# Patient Record
Sex: MALE | Race: WHITE | NOT HISPANIC OR LATINO | Employment: FULL TIME | URBAN - METROPOLITAN AREA
[De-identification: names, ages, dates, MRNs, and addresses within clinical notes are randomized per-mention and may not be internally consistent; named-entity substitution may affect disease eponyms.]

---

## 2018-09-21 ENCOUNTER — OFFICE VISIT - HEALTHEAST (OUTPATIENT)
Dept: FAMILY MEDICINE | Facility: CLINIC | Age: 33
End: 2018-09-21

## 2018-09-21 DIAGNOSIS — G56.00 CARPAL TUNNEL SYNDROME: ICD-10-CM

## 2018-09-21 DIAGNOSIS — M94.0 TIETZE'S DISEASE: ICD-10-CM

## 2018-09-21 DIAGNOSIS — R80.9 PROTEINURIA: ICD-10-CM

## 2018-09-21 DIAGNOSIS — Z00.00 HEALTH CARE MAINTENANCE: ICD-10-CM

## 2018-09-21 DIAGNOSIS — R53.83 FATIGUE: ICD-10-CM

## 2018-09-21 DIAGNOSIS — R42 INTERMITTENT VERTIGO: ICD-10-CM

## 2018-09-21 DIAGNOSIS — H53.9 VISUAL DISTURBANCE: ICD-10-CM

## 2018-09-21 LAB
ALBUMIN SERPL-MCNC: 4.7 G/DL (ref 3.5–5)
ALBUMIN UR-MCNC: NEGATIVE MG/DL
ALP SERPL-CCNC: 68 U/L (ref 45–120)
ALT SERPL W P-5'-P-CCNC: 11 U/L (ref 0–45)
ANION GAP SERPL CALCULATED.3IONS-SCNC: 8 MMOL/L (ref 5–18)
APPEARANCE UR: CLEAR
AST SERPL W P-5'-P-CCNC: 15 U/L (ref 0–40)
BASOPHILS # BLD AUTO: 0 THOU/UL (ref 0–0.2)
BASOPHILS NFR BLD AUTO: 0 % (ref 0–2)
BILIRUB SERPL-MCNC: 0.8 MG/DL (ref 0–1)
BILIRUB UR QL STRIP: NEGATIVE
BUN SERPL-MCNC: 13 MG/DL (ref 8–22)
CALCIUM SERPL-MCNC: 10 MG/DL (ref 8.5–10.5)
CHLORIDE BLD-SCNC: 105 MMOL/L (ref 98–107)
CHOLEST SERPL-MCNC: 203 MG/DL
CO2 SERPL-SCNC: 27 MMOL/L (ref 22–31)
COLOR UR AUTO: YELLOW
CREAT SERPL-MCNC: 0.85 MG/DL (ref 0.7–1.3)
EOSINOPHIL # BLD AUTO: 0.1 THOU/UL (ref 0–0.4)
EOSINOPHIL NFR BLD AUTO: 1 % (ref 0–6)
ERYTHROCYTE [DISTWIDTH] IN BLOOD BY AUTOMATED COUNT: 12.3 % (ref 11–14.5)
FASTING STATUS PATIENT QL REPORTED: YES
GFR SERPL CREATININE-BSD FRML MDRD: >60 ML/MIN/1.73M2
GLUCOSE BLD-MCNC: 92 MG/DL (ref 70–125)
GLUCOSE UR STRIP-MCNC: NEGATIVE MG/DL
HCT VFR BLD AUTO: 43 % (ref 40–54)
HDLC SERPL-MCNC: 62 MG/DL
HGB BLD-MCNC: 14.8 G/DL (ref 14–18)
HGB UR QL STRIP: NEGATIVE
KETONES UR STRIP-MCNC: NEGATIVE MG/DL
LDLC SERPL CALC-MCNC: 132 MG/DL
LEUKOCYTE ESTERASE UR QL STRIP: NEGATIVE
LYMPHOCYTES # BLD AUTO: 1.7 THOU/UL (ref 0.8–4.4)
LYMPHOCYTES NFR BLD AUTO: 43 % (ref 20–40)
MCH RBC QN AUTO: 30.8 PG (ref 27–34)
MCHC RBC AUTO-ENTMCNC: 34.4 G/DL (ref 32–36)
MCV RBC AUTO: 89 FL (ref 80–100)
MONOCYTES # BLD AUTO: 0.4 THOU/UL (ref 0–0.9)
MONOCYTES NFR BLD AUTO: 9 % (ref 2–10)
NEUTROPHILS # BLD AUTO: 1.9 THOU/UL (ref 2–7.7)
NEUTROPHILS NFR BLD AUTO: 47 % (ref 50–70)
NITRATE UR QL: NEGATIVE
PH UR STRIP: 7 [PH] (ref 5–8)
PLATELET # BLD AUTO: 223 THOU/UL (ref 140–440)
PMV BLD AUTO: 8.3 FL (ref 7–10)
POTASSIUM BLD-SCNC: 4.4 MMOL/L (ref 3.5–5)
PROT SERPL-MCNC: 7.3 G/DL (ref 6–8)
RBC # BLD AUTO: 4.81 MILL/UL (ref 4.4–6.2)
SODIUM SERPL-SCNC: 140 MMOL/L (ref 136–145)
SP GR UR STRIP: 1.01 (ref 1–1.03)
TRIGL SERPL-MCNC: 43 MG/DL
TSH SERPL DL<=0.005 MIU/L-ACNC: 1.21 UIU/ML (ref 0.3–5)
UROBILINOGEN UR STRIP-ACNC: NORMAL
WBC: 4.1 THOU/UL (ref 4–11)

## 2018-09-21 ASSESSMENT — MIFFLIN-ST. JEOR: SCORE: 1631.04

## 2018-09-24 ENCOUNTER — COMMUNICATION - HEALTHEAST (OUTPATIENT)
Dept: FAMILY MEDICINE | Facility: CLINIC | Age: 33
End: 2018-09-24

## 2018-09-24 LAB
25(OH)D3 SERPL-MCNC: 32.9 NG/ML (ref 30–80)
25(OH)D3 SERPL-MCNC: 32.9 NG/ML (ref 30–80)

## 2019-04-02 ENCOUNTER — OFFICE VISIT - HEALTHEAST (OUTPATIENT)
Dept: FAMILY MEDICINE | Facility: CLINIC | Age: 34
End: 2019-04-02

## 2019-04-02 DIAGNOSIS — B08.4 HAND, FOOT AND MOUTH DISEASE: ICD-10-CM

## 2019-04-02 DIAGNOSIS — L21.9 SEBORRHEIC DERMATITIS OF SCALP: ICD-10-CM

## 2019-11-11 ENCOUNTER — OFFICE VISIT - HEALTHEAST (OUTPATIENT)
Dept: FAMILY MEDICINE | Facility: CLINIC | Age: 34
End: 2019-11-11

## 2019-11-11 ENCOUNTER — COMMUNICATION - HEALTHEAST (OUTPATIENT)
Dept: FAMILY MEDICINE | Facility: CLINIC | Age: 34
End: 2019-11-11

## 2019-11-11 DIAGNOSIS — Z00.00 HEALTH CARE MAINTENANCE: ICD-10-CM

## 2019-11-11 DIAGNOSIS — G47.9 SLEEP DISTURBANCE: ICD-10-CM

## 2019-11-11 LAB
ALBUMIN SERPL-MCNC: 4.6 G/DL (ref 3.5–5)
ALP SERPL-CCNC: 66 U/L (ref 45–120)
ALT SERPL W P-5'-P-CCNC: 17 U/L (ref 0–45)
ANION GAP SERPL CALCULATED.3IONS-SCNC: 10 MMOL/L (ref 5–18)
AST SERPL W P-5'-P-CCNC: 20 U/L (ref 0–40)
BILIRUB SERPL-MCNC: 1 MG/DL (ref 0–1)
BUN SERPL-MCNC: 11 MG/DL (ref 8–22)
CALCIUM SERPL-MCNC: 9.6 MG/DL (ref 8.5–10.5)
CHLORIDE BLD-SCNC: 104 MMOL/L (ref 98–107)
CHOLEST SERPL-MCNC: 213 MG/DL
CO2 SERPL-SCNC: 28 MMOL/L (ref 22–31)
CREAT SERPL-MCNC: 0.81 MG/DL (ref 0.7–1.3)
FASTING STATUS PATIENT QL REPORTED: YES
GFR SERPL CREATININE-BSD FRML MDRD: >60 ML/MIN/1.73M2
GLUCOSE BLD-MCNC: 93 MG/DL (ref 70–125)
HDLC SERPL-MCNC: 57 MG/DL
LDLC SERPL CALC-MCNC: 135 MG/DL
POTASSIUM BLD-SCNC: 4.3 MMOL/L (ref 3.5–5)
PROT SERPL-MCNC: 7.3 G/DL (ref 6–8)
SODIUM SERPL-SCNC: 142 MMOL/L (ref 136–145)
TRIGL SERPL-MCNC: 104 MG/DL

## 2019-11-11 ASSESSMENT — MIFFLIN-ST. JEOR: SCORE: 1635.58

## 2019-11-22 ENCOUNTER — COMMUNICATION - HEALTHEAST (OUTPATIENT)
Dept: FAMILY MEDICINE | Facility: CLINIC | Age: 34
End: 2019-11-22

## 2020-02-17 ENCOUNTER — OFFICE VISIT - HEALTHEAST (OUTPATIENT)
Dept: SLEEP MEDICINE | Facility: CLINIC | Age: 35
End: 2020-02-17

## 2020-02-17 DIAGNOSIS — G47.8 SLEEP DYSFUNCTION WITH SLEEP STAGE DISTURBANCE: ICD-10-CM

## 2020-02-17 DIAGNOSIS — R40.0 INTERMITTENT DROWSINESS: ICD-10-CM

## 2020-02-17 DIAGNOSIS — R06.83 SNORING: ICD-10-CM

## 2020-02-17 ASSESSMENT — MIFFLIN-ST. JEOR: SCORE: 1641.02

## 2020-11-24 ENCOUNTER — OFFICE VISIT - HEALTHEAST (OUTPATIENT)
Dept: FAMILY MEDICINE | Facility: CLINIC | Age: 35
End: 2020-11-24
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
## 2020-11-24 DIAGNOSIS — R44.8 PARESTHESIA OF TONGUE: ICD-10-CM

## 2020-11-27 ENCOUNTER — AMBULATORY - HEALTHEAST (OUTPATIENT)
Dept: LAB | Facility: CLINIC | Age: 35
End: 2020-11-27
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
## 2020-11-30 ENCOUNTER — COMMUNICATION - HEALTHEAST (OUTPATIENT)
Dept: FAMILY MEDICINE | Facility: CLINIC | Age: 35
End: 2020-11-30

## 2020-11-30 LAB — B BURGDOR IGG+IGM SER QL: 0.2 INDEX VALUE

## 2021-01-28 ENCOUNTER — TELEPHONE (OUTPATIENT)
Dept: SLEEP MEDICINE | Facility: CLINIC | Age: 36
End: 2021-01-28

## 2021-01-28 DIAGNOSIS — R40.0 INTERMITTENT DROWSINESS: Primary | ICD-10-CM

## 2021-01-28 DIAGNOSIS — G47.9 SLEEP DISTURBANCE: ICD-10-CM

## 2021-01-28 DIAGNOSIS — R06.83 SNORING: ICD-10-CM

## 2021-01-28 NOTE — TELEPHONE ENCOUNTER
Reason for call:  Other   Patient called regarding (reason for call): appointment  Additional comments: Patient called to schedule sleep study, already had consult.    Phone number to reach patient:  Cell number on file:    Telephone Information:   Mobile [phone]       Best Time:  Anytime    Can we leave a detailed message on this number?  YES    Travel screening: Not Applicable

## 2021-02-01 NOTE — TELEPHONE ENCOUNTER
Reviewed [facility] chart and did not see office visit or sleep study order. Reviewed [facility] chart and patient had consult with Dr. Su 2/17/2020 and requested to have a HST. Order for HST was not placed.      Federico Flores  Sleep Clinic Specialist - [place]

## 2021-02-08 NOTE — TELEPHONE ENCOUNTER
Called 2/8 and left voicemail for See to please return our call to schedule HST and two week follow up with provider to go over results.    Federico Flores  Sleep Clinic Specialist - [place]

## 2021-03-22 ENCOUNTER — OFFICE VISIT (OUTPATIENT)
Dept: SLEEP MEDICINE | Facility: CLINIC | Age: 36
End: 2021-03-22
Payer: COMMERCIAL

## 2021-03-22 DIAGNOSIS — G47.9 SLEEP DISTURBANCE: ICD-10-CM

## 2021-03-22 DIAGNOSIS — R40.0 INTERMITTENT DROWSINESS: ICD-10-CM

## 2021-03-22 DIAGNOSIS — R06.83 SNORING: ICD-10-CM

## 2021-03-22 PROCEDURE — G0399 HOME SLEEP TEST/TYPE 3 PORTA: HCPCS | Performed by: INTERNAL MEDICINE

## 2021-03-23 ENCOUNTER — DOCUMENTATION ONLY (OUTPATIENT)
Dept: SLEEP MEDICINE | Facility: CLINIC | Age: 36
End: 2021-03-23
Payer: COMMERCIAL

## 2021-03-23 NOTE — PROGRESS NOTES
HST POST-STUDY QUESTIONNAIRE    1. What time did you go to bed?  10  2. How long do you think it took to fall asleep?  15 min  3. What time did you wake up to start the day?  0630  4. Did you get up during the night at all?  yes  5. If you woke up, do you remember approximately what time(s)? 0300  6. Did you have any difficulty with the equipment?  No  7. Did you us any type of treatment with this study?  None  8. Was the head of the bed elevated? No  9. Did you sleep in a recliner?  No  10. Did you stop using CPAP at least 3 days before this test?  NA  11. Any other information you'd like us to know? It seems like I was on my back the whole night.      This HSAT was performed using a Noxturnal T3 device which recorded snore, sound, movement activity, body position, nasal pressure, oronasal thermal airflow, pulse, oximetry and both chest and abdominal respiratory effort. HSAT data was restricted to the time patient states they were in bed.     HSAT was scored using 1B 4% hypopnea rule.     HST AHI (Non-PAT): 1.7  Snoring was reported as intermittent.  Time with SpO2 below 89% was 0 minutes.   Overall signal quality was good     Pt will follow up with sleep provider to determine appropriate therapy.

## 2021-03-23 NOTE — PROCEDURES
"HOME SLEEP STUDY INTERPRETATION    Patient: See Blakely  MRN: [id number]  YOB: 1985  Study Date: 3/22/2021  Referring Provider: Cristian Scruggs;   Ordering Provider: Seth Su DO     Indications for Home Study: See Blakely is a 35 year old male who presents with symptoms suggestive of obstructive sleep apnea.    Estimated body mass index is 23.4 kg/m  as calculated from the following:    Height as of 12/20/06: 1.676 m (5' 6\").    Weight as of 12/20/06: 65.8 kg (145 lb).    Data: A full night home sleep study was performed recording the standard physiologic parameters including body position, movement, sound, nasal pressure, thermal oral airflow, chest and abdominal movements with respiratory inductance plethysmography, and oxygen saturation by pulse oximetry. Pulse rate was estimated by oximetry recording. This study was considered adequate based on > 4 hours of quality oximetry and respiratory recording. As specified by the AASM Manual for the Scoring of Sleep and Associated events, version 2.3, Rule VIII.D 1B, 4% oxygen desaturation scoring for hypopneas is used as a standard of care on all home sleep apnea testing.    Analysis Time:  462.7 minutes    Respiration:   Sleep Associated Hypoxemia: sustained hypoxemia was not present. Baseline oxygen saturation was 93.5%.  Time with saturation less than or equal to 88% was 0 minutes. The lowest oxygen saturation was 90%.   Snoring: Snoring was present.  Respiratory events: The home study revealed a presence of 7 obstructive apneas and 6 mixed and central apneas. There were 3 hypopneas resulting in a combined apnea/hypopnea index [AHI] of 2.1 events per hour.  AHI was 2.1 per hour supine, 0 per hour prone, 0 per hour on left side, and 0 per hour on right side.   Pattern: Excluding events noted above, respiratory rate and pattern was Normal.    Position: Percent of time spent: supine - 99.6%, prone - 0%, on left - 0%, on right - " 0%.    Heart Rate: By pulse oximetry normal rate was noted.     Assessment:   Patient did not rule in for obstructive sleep apnea.  Sleep associated hypoxemia was not present.    Recommendations:  Consider oral appliance therapy or positional therapy.  Suggest optimizing sleep hygiene and avoiding sleep deprivation.  Weight management.    Diagnosis Code(s): Snoring R06.83    Seth Su DO, March 23, 2021   Diplomate, American Board of Internal Medicine, Sleep Medicine

## 2021-04-01 NOTE — PROGRESS NOTES
See is a 35 year old who is being evaluated via a billable video visit.      How would you like to obtain your AVS? MyChart  If the video visit is dropped, the invitation should be resent by: Text to cell phone: [phone]  Will anyone else be joining your video visit? No    Video-Visit Details    Type of service:  Video Visit    Originating Location (pt. Location): Home    Distant Location (provider location):  [facility]     Platform used for Video Visit: [facility] On [facility]    Thank you for the opportunity to participate in the care of See Blakely.     He is a 35 year old  male patient who comes to the sleep medicine clinic for review of sleep study results. The study was completed on 03/22/21  which showed that the patient had snoring but did not rule in for obstructive sleep apnea.    Assessment and Plan:  1. Snoring  We discussed the treatment options available including weight loss, oral appliance, positional therapy and muscle strengthening exercises. I also offered the patient the option of getting an in lab sleep study. He would like to discuss this with his wife before making a final decision.    Patient Active Problem List   Diagnosis     Migraine headache     Other malaise and fatigue     Pectus excavatum     Tietze's disease       Current Outpatient Medications   Medication Sig Dispense Refill     NO ACTIVE MEDICATIONS .         No Known Allergies    Labs/Studies:  - We reviewed the results of the overnight study as described on the HPI.     No results found for: MODESTA      Patient verbalized understanding of these issues, agrees with the plan and all questions were answered today. Patient was given an opportuntity to voice any other symptoms or concerns not listed above. Patient did not have any other symptoms or concerns.        Seth Su DO  Board Certified in Internal Medicine and Sleep Medicine    I spent a total of 10 minutes of face-to-face encounter and in  preparation for this clinic visit.    (Note created with Dragon voice recognition and unintended spelling errors and word substitutions may occur)

## 2021-04-05 ENCOUNTER — VIRTUAL VISIT (OUTPATIENT)
Dept: SLEEP MEDICINE | Facility: CLINIC | Age: 36
End: 2021-04-05
Payer: COMMERCIAL

## 2021-04-05 DIAGNOSIS — R06.83 SNORING: Primary | ICD-10-CM

## 2021-04-05 PROBLEM — M94.0 TIETZE'S DISEASE: Status: ACTIVE | Noted: 2021-04-05

## 2021-04-05 PROBLEM — R53.83 OTHER MALAISE AND FATIGUE: Status: ACTIVE | Noted: 2021-04-05

## 2021-04-05 PROBLEM — G43.909 MIGRAINE HEADACHE: Status: ACTIVE | Noted: 2021-04-05

## 2021-04-05 PROBLEM — Q67.6 PECTUS EXCAVATUM: Status: ACTIVE | Noted: 2021-04-05

## 2021-04-05 PROBLEM — R53.81 OTHER MALAISE AND FATIGUE: Status: ACTIVE | Noted: 2021-04-05

## 2021-04-05 PROCEDURE — 99207 PR NO BILLABLE SERVICE THIS VISIT: CPT | Performed by: INTERNAL MEDICINE

## 2021-05-27 NOTE — PATIENT INSTRUCTIONS - HE
Use prescription shampoo daily for two weeks.     After improved can use Selsum Blue or T gel type shampoo once or twice a week.

## 2021-05-27 NOTE — PROGRESS NOTES
ASSESSMENT:  1. Seborrheic dermatitis of scalp  ketoconazole (NIZORAL) 2 % shampoo   2. Hand, foot and mouth disease         PLAN:  Rash on scalp consistent with seborrheic dermatitis. Trial shampoo and let us know if not working as expected.  Hand foot and mouth is viral, continue symptomatic cares, discussed expected course of illness.  No problem-specific Assessment & Plan notes found for this encounter.      Patient Instructions   Use prescription shampoo daily for two weeks.     After improved can use Selsum Blue or T gel type shampoo once or twice a week.      No orders of the defined types were placed in this encounter.    There are no discontinued medications.    No Follow-up on file.    CHIEF COMPLAINT:  Chief Complaint   Patient presents with     Rash     right side on top of head, hands and feet       HISTORY OF PRESENT ILLNESS:  See is a 33 y.o. male here for evaluation of scalp rash on and off for the past few months. Has gotten worse in the past week since he thinks he has hand foot and mouth which has been going around at child's . He has rash in his throat and on hands. He had a fever on Friday, 4 days ago. No fever since. Scalp rash preceded HFM. He has had it on/off for about 6 months but it is worse. He is noticing red spots with some crusting. Sometimes itchy.     REVIEW OF SYSTEMS:        All other systems are negative  PFSH:  Reviewed, no changes      TOBACCO USE:  Social History     Tobacco Use   Smoking Status Never Smoker   Smokeless Tobacco Never Used       VITALS:  Vitals:    04/02/19 1003   BP: 113/80   Patient Site: Left Arm   Patient Position: Sitting   Cuff Size: Adult Regular   Pulse: 69   Resp: 16   Temp: 97.9  F (36.6  C)   TempSrc: Oral   Weight: 143 lb 3.2 oz (65 kg)     Wt Readings from Last 3 Encounters:   04/02/19 143 lb 3.2 oz (65 kg)   09/21/18 148 lb 12.8 oz (67.5 kg)       PHYSICAL EXAM:   /80 (Patient Site: Left Arm, Patient Position: Sitting, Cuff Size:  Adult Regular)   Pulse 69   Temp 97.9  F (36.6  C) (Oral)   Resp 16   Wt 143 lb 3.2 oz (65 kg)   BMI 19.83 kg/m    General appearance: alert, appears stated age and cooperative  Head: Normocephalic, without obvious abnormality, atraumatic, scalp lesions, red scalp lesions with yellow crust  Eyes: conjunctivae/corneas clear. PERRL, EOM's intact. Fundi benign.  Ears: normal TM's and external ear canals both ears  Nose: Nares normal. Septum midline. Mucosa normal. No drainage or sinus tenderness.  Throat: lesions in back throat consistent with hand foot mouth  Neck: mild anterior cervical adenopathy, no carotid bruit, no JVD, supple, symmetrical, trachea midline and thyroid not enlarged, symmetric, no tenderness/mass/nodules  Lungs: clear to auscultation bilaterally  Skin: red lesions on hands fingertips    DATA REVIEWED:  Additional History from Old Records Summarized (2): 0  Decision to Obtain Records (1): 0  Radiology Tests Summarized or Ordered (1): 0  Labs Reviewed or Ordered (1): 0  Medicine Test Summarized or Ordered (1): 0  Independent Review of EKG or X-RAY(2 each): 0    The visit lasted a total of 20 minutes face to face with the patient. Over 50% of the time was spent counseling and educating the patient about plan of care.    MEDICATIONS:  Current Outpatient Medications   Medication Sig Dispense Refill     ketoconazole (NIZORAL) 2 % shampoo Shampoo daily for 2 weeks. Leave on scalp for approximately 5-10 minutes then rinse. 120 mL 0     No current facility-administered medications for this visit.        This note has been dictated using voice recognition software. Any grammatical or context distortions are unintentional and inherent to the software

## 2021-06-02 ENCOUNTER — RECORDS - HEALTHEAST (OUTPATIENT)
Dept: ADMINISTRATIVE | Facility: CLINIC | Age: 36
End: 2021-06-02

## 2021-06-02 VITALS — WEIGHT: 143.2 LBS | BODY MASS INDEX: 19.83 KG/M2

## 2021-06-02 VITALS — BODY MASS INDEX: 20.83 KG/M2 | WEIGHT: 148.8 LBS | HEIGHT: 71 IN

## 2021-06-03 VITALS
TEMPERATURE: 96.6 F | RESPIRATION RATE: 16 BRPM | DIASTOLIC BLOOD PRESSURE: 66 MMHG | WEIGHT: 149.8 LBS | BODY MASS INDEX: 20.97 KG/M2 | HEART RATE: 64 BPM | HEIGHT: 71 IN | SYSTOLIC BLOOD PRESSURE: 115 MMHG

## 2021-06-03 NOTE — PROGRESS NOTES
Assessment:      Healthy male exam.    Encounter Diagnoses   Name Primary?     Health care maintenance Yes     Sleep disturbance          Plan:       All questions answered.       Fasting labs    Begin colonoscopy screening at age 39    Schedule sleep evaluation       Subjective:      See Blakely is a 34 y.o. male who presents for an annual exam. The patient reports that there is not domestic violence in his life.     Healthy Habits:   Regular Exercise: Yes and a little  Sunscreen Use: Yes  Healthy Diet: Yes  Dental Visits Regularly: Yes  Seat Belt: Yes  Sexually active: Yes  Monthly Self Testicular Exams:  Yes  Hemoccults: N/A  Flex Sig: N/A  Colonoscopy: N/A  Lipid Profile: Yes  Glucose Screen: Yes  Prevention of Osteoporosis: N/A  Last Dexa: N/A  Guns at Home:  No      Immunization History   Administered Date(s) Administered     Dtap 1985, 1985, 02/18/1985, 05/21/1986, 09/14/1989     Hep B, Peds or Adolescent 01/21/1993, 07/07/2000, 12/22/2000     Influenza, inj, historic,unspecified 10/26/2019     Influenza,seasonal,quad inj =/> 6months 09/26/2018     MMR 10/30/1996     OPV,Trivalent,Historic(5931-1531 only) 1984, 1984, 05/21/1986, 09/13/1989     Td, Adult, Absorbed 10/30/1996     Tdap 01/26/2015     Immunization status: up to date and documented.    No exam data present    Current Outpatient Medications   Medication Sig Dispense Refill     ketoconazole (NIZORAL) 2 % shampoo Shampoo daily for 2 weeks. Leave on scalp for approximately 5-10 minutes then rinse. 120 mL 0     No current facility-administered medications for this visit.      No past medical history on file.  No past surgical history on file.  Patient has no known allergies.  Family History   Problem Relation Age of Onset     Colon cancer Father 62     Throat cancer Father      Social History     Socioeconomic History     Marital status:      Spouse name: Not on file     Number of children: Not on file     Years of  education: Not on file     Highest education level: Not on file   Occupational History     Not on file   Social Needs     Financial resource strain: Not on file     Food insecurity:     Worry: Not on file     Inability: Not on file     Transportation needs:     Medical: Not on file     Non-medical: Not on file   Tobacco Use     Smoking status: Never Smoker     Smokeless tobacco: Never Used   Substance and Sexual Activity     Alcohol use: Yes     Alcohol/week: 3.0 standard drinks     Types: 3 Glasses of wine per week     Drug use: No     Sexual activity: Yes     Partners: Female   Lifestyle     Physical activity:     Days per week: Not on file     Minutes per session: Not on file     Stress: Not on file   Relationships     Social connections:     Talks on phone: Not on file     Gets together: Not on file     Attends Gnosticism service: Not on file     Active member of club or organization: Not on file     Attends meetings of clubs or organizations: Not on file     Relationship status: Not on file     Intimate partner violence:     Fear of current or ex partner: Not on file     Emotionally abused: Not on file     Physically abused: Not on file     Forced sexual activity: Not on file   Other Topics Concern     Not on file   Social History Narrative     Not on file       Review of Systems  General:  Denies problem  Eyes: Denies problem  Ears/Nose/Throat: Denies problem  Cardiovascular: Denies problem  Respiratory:  Denies problem  Gastrointestinal:  Denies problem  Genitourinary: Denies problem  Musculoskeletal:  Denies problem  Skin: Denies problem  Neurologic: Denies problem  Psychiatric: Denies problem  Endocrine: Denies problem  Heme/Lymphatic: Denies problem   Allergic/Immunologic: Denies problem       Right hip pain, intermittent, pain is achy and sharp at times, weight bearing or walking doesn't affect it.  More noted ;with sitting.  5/10 is worse pain,  About 4-5 months duration.       Objective:     Vitals:     "11/11/19 0730   BP: 115/66   Pulse: 64   Resp: 16   Temp: 96.6  F (35.9  C)   TempSrc: Oral   Weight: 149 lb 12.8 oz (67.9 kg)   Height: 5' 11.25\" (1.81 m)     Body mass index is 20.75 kg/m .    Physical  General Appearance: Alert, cooperative, no distress, appears stated age  Head: Normocephalic, without obvious abnormality, atraumatic  Eyes: PERRL, conjunctiva/corneas clear, EOM's intact  Ears: Normal TM's and external ear canals, both ears  Nose: Nares normal, septum midline,mucosa normal, no drainage  Throat: Lips, mucosa, and tongue normal; teeth and gums normal  Neck: Supple, symmetrical, trachea midline, no adenopathy;  thyroid: not enlarged, symmetric, no tenderness/mass/nodules; no carotid bruit or JVD  Back: Symmetric, no curvature, ROM normal, no CVA tenderness  Lungs: Clear to auscultation bilaterally, respirations unlabored  Heart: Regular rate and rhythm, S1 and S2 normal, no murmur, rub, or gallop,  Abdomen: Soft, non-tender, bowel sounds active all four quadrants,  no masses, no organomegaly  Genitourinary: Penis normal. Right testis is descended. Left testis is descended.   Musculoskeletal: Normal range of motion. No joint swelling or deformity.   Extremities: Extremities normal, atraumatic, no cyanosis or edema  Skin: Skin color, texture, turgor normal, no rashes or lesions  Lymph nodes: Cervical, supraclavicular, and axillary nodes normal  Neurologic: He is alert. He has normal reflexes.   Psychiatric: He has a normal mood and affect.            "

## 2021-06-03 NOTE — TELEPHONE ENCOUNTER
See dropped off a Wellness form .    Form placed in [employer] bin    Please call [phone] for pickup

## 2021-06-04 VITALS
OXYGEN SATURATION: 97 % | BODY MASS INDEX: 21.14 KG/M2 | DIASTOLIC BLOOD PRESSURE: 74 MMHG | SYSTOLIC BLOOD PRESSURE: 123 MMHG | HEART RATE: 72 BPM | WEIGHT: 151 LBS | HEIGHT: 71 IN

## 2021-06-06 NOTE — PROGRESS NOTES
Dear Dr. Decker, [facility]  [address] [place] [address]    Thank you for the opportunity to participate in the care of Mr. See Blakely.    Assessment and Plan:    In summary See Blakely is a 34 y.o. year old male here for evaluation of sleep disturbance.  1.  Intermittent drowsiness   Mr. See Blakely has high risk for obstructive sleep apnea based on the history of intermittent drowsiness, snoring and a crowded airway. I educated the patient on the underlying pathophysiology of obstructive sleep apnea. We reviewed the risks associated with sleep apnea, including increased cardiovascular risk and overall death. We talked about treatments briefly. I recommend getting  A Home sleep study. The patient should return to the clinic to discuss results and treatment option in a patient-centered approach.  2.  Snoring  3.  Other sleep disturbance    History of present illness:    He is a 34 y.o. male who comes to the clinic with a chief complaint of intermittent drowsiness that is been going on for more than a year.  While the patient denies any episodes of witnessed apnea he has been told that he does have loud snoring during sleep.  He would feel episodes of drowsiness on the weekend when he is reading to his children despite adequate hours of sleep.  The patient's review of system is otherwise negative.     Ideal Sleep-Wake Cycle(devoid of societal pressure):    Patient would try to initiate sleep at around 11 PM with a sleep latency of less than 10 minutes. The patient would have 1 awakening. Final wake up time is around 6 AM.    Patient told to return in one week after the sleep study is interpreted.    Past Medical History  History reviewed. No pertinent past medical history.     Past Surgical History  History reviewed. No pertinent surgical history.     Meds  Current Outpatient Medications   Medication Sig Dispense Refill     ketoconazole (NIZORAL) 2 % shampoo Shampoo daily for 2  weeks. Leave on scalp for approximately 5-10 minutes then rinse. 120 mL 0     No current facility-administered medications for this visit.         Allergies  Patient has no known allergies.     Social History  Social History     Socioeconomic History     Marital status:      Spouse name: Not on file     Number of children: Not on file     Years of education: Not on file     Highest education level: Not on file   Occupational History     Not on file   Social Needs     Financial resource strain: Not on file     Food insecurity:     Worry: Not on file     Inability: Not on file     Transportation needs:     Medical: Not on file     Non-medical: Not on file   Tobacco Use     Smoking status: Never Smoker     Smokeless tobacco: Never Used   Substance and Sexual Activity     Alcohol use: Yes     Alcohol/week: 3.0 standard drinks     Types: 3 Glasses of wine per week     Drug use: No     Sexual activity: Yes     Partners: Female   Lifestyle     Physical activity:     Days per week: Not on file     Minutes per session: Not on file     Stress: Not on file   Relationships     Social connections:     Talks on phone: Not on file     Gets together: Not on file     Attends Zoroastrian service: Not on file     Active member of club or organization: Not on file     Attends meetings of clubs or organizations: Not on file     Relationship status: Not on file     Intimate partner violence:     Fear of current or ex partner: Not on file     Emotionally abused: Not on file     Physically abused: Not on file     Forced sexual activity: Not on file   Other Topics Concern     Not on file   Social History Narrative     Not on file        Family History  Family History   Problem Relation Age of Onset     Colon cancer Father 62     Throat cancer Father      Snoring Father      Review of Systems:  Constitutional: Negative except as noted in HPI.   Eyes: Negative except as noted in HPI.   ENT: Negative except as noted in HPI.  "  Cardiovascular: Negative except as noted in HPI.   Respiratory: Negative except as noted in HPI.   Gastrointestinal: Negative except as noted in HPI.   Genitourinary: Negative except as noted in HPI.   Musculoskeletal: Negative except as noted in HPI.   Integumentary: Negative except as noted in HPI.   Neurological: Negative except as noted in HPI.   Psychiatric: Negative except as noted in HPI.   Endocrine: Negative except as noted in HPI.   Hematologic/Lymphatic: Negative except as noted in HPI.      STOP BANG 2/17/2020   Do you snore loudly (louder than talking or loud enough to be heard through closed doors)? 1   Do you often feel tired, fatigued, or sleepy during daytime? 1   Has anyone observed you stop breathing in your sleep? 0   Do you have or are you being treated for high blood pressure? 0   BMI more than 35 kg/m2 0   Age over 50 years old? 0   Neck circumference greater than 16 inches? 0   Gender male? 1   Total Score 3     Epworths Sleepiness Scale 2/17/2020   Sitting and reading 3   Watching TV 2   Sitting, inactive in a public place (e.g. a theatre or a meeting) 0   As a passenger in a car for an hour without a break 2   Lying down to rest in the afternoon when circumstances permit 3   Sitting and talking to someone 1   Sitting quietly after a lunch without alcohol 0   In a car, while stopped for a few minutes in traffic 0   Total score 11     Rooming 2/17/2020   Usual bedtime 9-10pm   Sleep Latency <10 minutes   Awakenings 0-1 time   Wake Up Time 6am   Weekends varies   Coffee 1 cup qd   Difficulty falling asleep No   Difficulty staying asleep No   Excessive daytime tiredness No   Excessive daytime sleepiness Yes   Dozing off while driving Yes   Shift Worker No   Sleep Walking? No   Sleep Talking? Yes   Kicking or punching? Yes   Restless legs symptoms No       Physical Exam:  /74   Pulse 72   Ht 5' 11.25\" (1.81 m)   Wt 151 lb (68.5 kg)   SpO2 97%   BMI 20.91 kg/m    BMI:Body mass index " "is 20.91 kg/m .   GEN: NAD, appropriate for age  Head: Normocephalic.  EYES: PERRLA, EOMI  ENT: Oropharynx is clear, Paulino class 3+ airway. Uvula is intact  Nasal mucosa is moist without erythema  Neck : Thyroid is within normal limits. Neck Circ 14\"  CV: Regular rate and rhythm, S1 & S2 positive.  LUNGS: Bilateral breathsounds heard.   ABDOMEN: Positive bowel sounds in all quadrants, soft, no rebound or guarding  MUSCULOSKELETAL: Bilateral trace leg swelling  SKIN: warm, dry, no rashes  Neurological: Alert, oriented to time, place, and person.  Psych: normal mood, normal affect     Labs/Studies:     Lab Results   Component Value Date    WBC 4.1 09/21/2018    HGB 14.8 09/21/2018    HCT 43.0 09/21/2018    MCV 89 09/21/2018     09/21/2018         Chemistry        Component Value Date/Time     11/11/2019 0800    K 4.3 11/11/2019 0800     11/11/2019 0800    CO2 28 11/11/2019 0800    BUN 11 11/11/2019 0800    CREATININE 0.81 11/11/2019 0800    GLU 93 11/11/2019 0800        Component Value Date/Time    CALCIUM 9.6 11/11/2019 0800    ALKPHOS 66 11/11/2019 0800    AST 20 11/11/2019 0800    ALT 17 11/11/2019 0800    BILITOT 1.0 11/11/2019 0800            No results found for: FERRITIN  Lab Results   Component Value Date    TSH 1.21 09/21/2018         Patient verbalized understanding of these issues, agrees with the plan and all questions were answered today. Patient was given an opportuntity to voice any other symptoms or concerns not listed above. Patient did not have any other symptoms or concerns.         Seth Su DO  Board Certified in Internal Medicine and Sleep Medicine    (Note created with Dragon voice recognition and unintended spelling errors and word substitutions may occur)     "

## 2021-06-06 NOTE — PATIENT INSTRUCTIONS - HE
What is a Home Sleep Study?    your doctor can give you a portable sleep monitor to use at home, so you don t have to spend the night in the sleep lab. But you should use a portable monitor only if:   ?Your doctor thinks you have a condition that makes you stop breathing for short periods while you are asleep, called  sleep apnea.    ?You do not have other serious medical problems, such as heart disease or lung disease.    Please bring the home sleep study device back to the sleep center as soon as you are finished with it so we can score it.     The cost of care estimate line is [phone]. They are able to give the patient an estimate of the charges and also an estimate of their insurance coverage/patient responsibility.   After your sleep study is performed, please call us at [phone] to schedule for a follow up to review the results of the sleep study.    Consider using one tab of low dose melatonin 3 mg or less on the night of the study.    It is completely voluntary.    Do not drive or operate machinery after intake of melatonin.

## 2021-06-13 NOTE — PATIENT INSTRUCTIONS - HE
Schedule a lab appt for a Lyme screen    If tongue paresthesias do not resolve over the next week then follow up.

## 2021-06-13 NOTE — PROGRESS NOTES
"See Blakely is a 35 y.o. male who is being evaluated via a billable video visit.      The patient has been notified of following:     \"This video visit will be conducted via a call between you and your physician/provider. We have found that certain health care needs can be provided without the need for an in-person physical exam.  This service lets us provide the care you need with a video conversation.  If a prescription is necessary we can send it directly to your pharmacy.  If lab work is needed we can place an order for that and you can then stop by our lab to have the test done at a later time.    Video visits are billed at different rates depending on your insurance coverage. Please reach out to your insurance provider with any questions.    If during the course of the call the physician/provider feels a video visit is not appropriate, you will not be charged for this service.\"    Patient has given verbal consent to a Video visit? Yes  How would you like to obtain your AVS? AVS Preference: MyChart.  If dropped by the video visit, the video invitation should be sent to: Text to cell phone: [phone]  Will anyone else be joining your video visit? No          HPI:  Last week bout 5 days ago felt like the tip of his tongue was burnt.  Last couple days it has moved to the sides of the tongue.  Can feel it if he scrunches his tongue up.   Today is surprisingly better.  Tongue doesn't look red.  Yesterday and today maybe some slight soreness? In the throat.  About two weeks ago had a deer tick on his leg.  The tick may have been attached 2-3 days.   The tick was not engorged.  He thinks it was a deer tick.    No loss of sense of taste or smell  No cough, shortness of breath, fever or chills.  Slight congestion, nasal, very mild.  No diarrhea.    Encounter Diagnoses   Name Primary?     Paresthesia of tongue      Tick bite, initial encounter       PLAN:   Schedule a lab appt for a Lyme screen    If tongue " paresthesias do not resolve over the next week then follow up.       Video Start Time: 11:09 AM    Additional provider notes:       Video-Visit Details    Type of service:  Video Visit    Video End Time (time video stopped): 11:24 AM  Originating Location (pt. Location): Home    Distant Location (provider location):  [facility]     Platform used for Video Visit: Nancy Decker MD

## 2021-06-19 NOTE — LETTER
Letter by Bryant Decker MD at      Author: Bryant Decker MD Service: -- Author Type: --    Filed:  Encounter Date: 11/11/2019 Status: Signed         See Blakely  [address] [facility] [address]             November 11, 2019         Dear Mr. Blakely,    Below are the results from your recent visit:    Resulted Orders   Lipid Profile   Result Value Ref Range    Triglycerides 104 <=149 mg/dL    Cholesterol 213 (H) <=199 mg/dL    LDL Calculated 135 (H) <=129 mg/dL    HDL Cholesterol 57 >=40 mg/dL    Patient Fasting > 8hrs? Yes    Comprehensive Metabolic Panel   Result Value Ref Range    Sodium 142 136 - 145 mmol/L    Potassium 4.3 3.5 - 5.0 mmol/L    Chloride 104 98 - 107 mmol/L    CO2 28 22 - 31 mmol/L    Anion Gap, Calculation 10 5 - 18 mmol/L    Glucose 93 70 - 125 mg/dL    BUN 11 8 - 22 mg/dL    Creatinine 0.81 0.70 - 1.30 mg/dL    GFR MDRD Af Amer >60 >60 mL/min/1.73m2    GFR MDRD Non Af Amer >60 >60 mL/min/1.73m2    Bilirubin, Total 1.0 0.0 - 1.0 mg/dL    Calcium 9.6 8.5 - 10.5 mg/dL    Protein, Total 7.3 6.0 - 8.0 g/dL    Albumin 4.6 3.5 - 5.0 g/dL    Alkaline Phosphatase 66 45 - 120 U/L    AST 20 0 - 40 U/L    ALT 17 0 - 45 U/L    Narrative    Fasting Glucose reference range is 70-99 mg/dL per  American Diabetes Association (ADA) guidelines.       Hi Tru:  Your cholesterol panel looks good and similar to last year.  The HDL (the good cholesterol) is excellent.  The LDL (the bad cholesterol) is just slightly above goal of <130.  The remaining labs are normal.  We will plan on you beginning Colonoscopies at age 39 as we discussed today.  It was a pleasure seeing you today.      Please call with questions or contact us using [employer].    Sincerely,        Electronically signed by Bryant Decker MD

## 2021-06-20 NOTE — PROGRESS NOTES
Assessment:      Healthy male exam.    Encounter Diagnoses   Name Primary?     Carpal tunnel syndrome Yes     Costochondritis (Tietze's Syndrome)      Visual disturbance      Health care maintenance      Proteinuria      Intermittent vertigo      Fatigue          Plan:          Patient Instructions   Follow up complete eye exam with your eye specialist    Fasting labs    Epley excercises          Subjective:      See Blakely is a 33 y.o. male who presents for an annual exam. The patient reports that there is not domestic violence in his life.     Healthy Habits:   Regular Exercise: Yes  Sunscreen Use: Yes  Healthy Diet: Yes  Dental Visits Regularly: Yes  Seat Belt: Yes  Sexually active: Yes  Monthly Self Testicular Exams:  Yes  Hemoccults: N/A  Flex Sig: N/A  Colonoscopy: N/A  Lipid Profile: Yes  Glucose Screen: Yes  Prevention of Osteoporosis: Yes  Last Dexa: N/A  Guns at Home:  No      Immunization History   Administered Date(s) Administered     Dtap 1984, 1984, 02/18/1985, 05/21/1986, 09/14/1989     Hep B, Peds or Adolescent 01/21/1993, 07/07/2000, 12/22/2000     MMR 10/30/1996     OPV,Trivalent,Historic([id number] only) 1984, 1984, 05/21/1986, 09/13/1989     Td, Adult, Absorbed 10/30/1996     Tdap 01/26/2015     Immunization status: up to date and documented.    No exam data present    No current outpatient prescriptions on file.     No current facility-administered medications for this visit.      No past medical history on file.  No past surgical history on file.  Review of patient's allergies indicates no known allergies.  No family history on file.  Social History     Social History     Marital status:      Spouse name: N/A     Number of children: N/A     Years of education: N/A     Occupational History     Not on file.     Social History Main Topics     Smoking status: Never Smoker     Smokeless tobacco: Not on file     Alcohol use 1.8 oz/week     3 Glasses of wine per  "week     Drug use: No     Sexual activity: Yes     Partners: Female     Other Topics Concern     Not on file     Social History Narrative     No narrative on file       Review of Systems  General:  Denies problem  Eyes: lately seeing a line of light cross his vision  Ears/Nose/Throat: always some ringing in both ears, hoarseness, about a month ago had some bad vertigo  Cardiovascular: Denies problem  Respiratory:  Denies problem  Gastrointestinal:  Denies problem  Genitourinary: Denies problem  Musculoskeletal:  Denies problem  Skin: dry and itchy skin  Neurologic: Denies problem  Psychiatric: Denies problem  Endocrine: Denies problem  Heme/Lymphatic: Denies problem   Allergic/Immunologic: Denies problem        Objective:     Vitals:    09/21/18 1147   BP: 119/73   Pulse: 68   Resp: 12   Temp: 97.5  F (36.4  C)   TempSrc: Oral   SpO2: 100%   Weight: 148 lb 12.8 oz (67.5 kg)   Height: 5' 11.25\" (1.81 m)     Body mass index is 20.61 kg/(m^2).    Physical  General Appearance: Alert, cooperative, no distress, appears stated age  Head: Normocephalic, without obvious abnormality, atraumatic  Eyes: PERRL, conjunctiva/corneas clear, EOM's intact  Ears: Normal TM's and external ear canals, both ears  Nose: Nares normal, septum midline,mucosa normal, no drainage  Throat: Lips, mucosa, and tongue normal; teeth and gums normal  Neck: Supple, symmetrical, trachea midline, no adenopathy;  thyroid: not enlarged, symmetric, no tenderness/mass/nodules; no carotid bruit or JVD  Back: Symmetric, no curvature, ROM normal, no CVA tenderness  Lungs: Clear to auscultation bilaterally, respirations unlabored  Heart: Regular rate and rhythm, S1 and S2 normal, no murmur, rub, or gallop,  Abdomen: Soft, non-tender, bowel sounds active all four quadrants,  no masses, no organomegaly  Genitourinary: Penis normal. Right testis is descended. Left testis is descended.   Musculoskeletal: Normal range of motion. No joint swelling or deformity. "   Extremities: Extremities normal, atraumatic, no cyanosis or edema  Skin: Skin color, texture, turgor normal, no rashes or lesions  Lymph nodes: Cervical, supraclavicular, and axillary nodes normal  Neurologic: He is alert. He has normal reflexes.   Psychiatric: He has a normal mood and affect.        Lab Results   Component Value Date    COLORU Yellow 03/19/2012    CLARITYU Clear 03/19/2012    GLUCOSEU Negative 03/19/2012    BILIRUBINUR Negative 03/19/2012    KETONESU Negative 03/19/2012    SPECGRAV 1.015 03/19/2012    HGBUA Negative 03/19/2012    PHUR 7.0 03/19/2012    PROTEINUA Negative 03/19/2012    UROBILINOGEN 0.2 EU/dL 03/19/2012    NITRITE Negative 03/19/2012    LEUKOCYTESUR Negative 03/19/2012     Vitamin D, Total (25-Hydroxy)   Date Value Ref Range Status   02/22/2012 38.7 30.0 - 80.0 ng/mL Final     Comment:                    Deficiency              <10.0 ng/mL               Insufficiency       10.0-29.9 ng/mL               Sufficiency         30.0-80.0 ng/mL               Toxicity (possible)    >100.0 ng/mL      Results for orders placed or performed in visit on 02/22/12   Comprehensive Metabolic Panel   Result Value Ref Range    Sodium 144 136 - 145 mmol/L    AST 18 <41 IU/L    ALT 17 <46 IU/L    Globulin 2.7 1.5 - 3.5 g/dL    Anion Gap, Calculation 16 5 - 18 mmol/L    A/G Ratio 1.9 1.0 - 2.2    Alkaline Phosphatase 63 45 - 120 IU/L    Protein, Total 7.8 6.0 - 8.0 g/dL    Glucose 98 70 - 125 mg/dL    Potassium 4.4 3.5 - 5.0 mmol/L    Albumin 5.1 (H) 3.5 - 5.0 g/dL    Bilirubin, Total 0.8 <1.1 mg/dL    BUN 14 8 - 22 mg/dL    Calcium 10.4 8.5 - 10.5 mg/dL    Chloride 105 98 - 107 mmol/L    Creatinine 1.10 0.70 - 1.30 mg/dL    CO2 24 22 - 31 mmol/L    GFR MDRD Non Af Amer >60 >60 ml/min/1.73m2    GFR MDRD Af Amer >60 >60 ml/min/1.73m2     Lab Results   Component Value Date    CHOL 205 (H) 02/22/2012     Lab Results   Component Value Date    HDL 53 02/22/2012     Lab Results   Component Value Date     LDLCALC 136 (H) 02/22/2012     Lab Results   Component Value Date    TRIG 80 02/22/2012     No components found for: CHOLHDL

## 2021-06-21 NOTE — LETTER
Letter by Bryant Decker MD at      Author: Bryant Decker MD Service: -- Author Type: --    Filed:  Encounter Date: 11/30/2020 Status: (Other)         See Blakely  [address] [facility] [address]             November 30, 2020         Dear Mr. Blakely,    Below are the results from your recent visit:    Resulted Orders   Lyme Antibody Cascade   Result Value Ref Range    Lyme Antibody Cascade 0.20 <0.90 Index Value    Narrative    Interpretation of Lyme Disease Total Antibody (IgG/IgM)  <0.90 Test Value=Negative  No detectable antibodies to B. burgdorferi. Patients  in early stages of infection may not produce  detectable levels of antibody. Antibiotic therapy  in early disease may prevent antibody production  from reaching detectable levels. Patients with  clinical history and/or symptoms suggestive of Lyme  disease but with negative test results should be  retested in 2-4 weeks.  0.90-<1.10 Test Value=Borderline  Suggests the presence of antibodies to B.  burgdorferi. Recommend repeat collection in 2-4  weeks.  >=1.10 Test Value=Positive  Indicates the presence of antibodies to B.  burgdorferi. False positive results can occur with  sera from syphilis patients. Cross-reactivity may  occur with relapsing fever, [place] Spotted  fever, other spirochetal diseases, erythematosus,  EBV infection, or CMV infection. Clinical symptoms,  epidemiology of the case and other laboratory tests  should allow for distinction of these conditions from  Lyme disease.       Barry Ott:  Your Lyme screen is NEGATIVE.  Follow up if fever, joint pain or new skin rash develop..    Please call with questions or contact us using [employer].    Sincerely,        Electronically signed by Bryant Decker MD

## 2021-07-31 ENCOUNTER — HEALTH MAINTENANCE LETTER (OUTPATIENT)
Age: 36
End: 2021-07-31

## 2021-09-25 ENCOUNTER — HEALTH MAINTENANCE LETTER (OUTPATIENT)
Age: 36
End: 2021-09-25

## 2021-12-24 ENCOUNTER — OFFICE VISIT (OUTPATIENT)
Dept: FAMILY MEDICINE | Facility: CLINIC | Age: 36
End: 2021-12-24
Payer: COMMERCIAL

## 2021-12-24 VITALS
RESPIRATION RATE: 16 BRPM | BODY MASS INDEX: 22.46 KG/M2 | WEIGHT: 160.4 LBS | DIASTOLIC BLOOD PRESSURE: 76 MMHG | HEART RATE: 76 BPM | SYSTOLIC BLOOD PRESSURE: 115 MMHG | TEMPERATURE: 96.2 F | HEIGHT: 71 IN

## 2021-12-24 DIAGNOSIS — Z11.59 NEED FOR HEPATITIS C SCREENING TEST: ICD-10-CM

## 2021-12-24 DIAGNOSIS — Z00.00 HEALTHCARE MAINTENANCE: ICD-10-CM

## 2021-12-24 DIAGNOSIS — Z11.4 ENCOUNTER FOR SCREENING FOR HIV: ICD-10-CM

## 2021-12-24 LAB
ALBUMIN SERPL-MCNC: 4.5 G/DL (ref 3.5–5)
ALP SERPL-CCNC: 68 U/L (ref 45–120)
ALT SERPL W P-5'-P-CCNC: 31 U/L (ref 0–45)
ANION GAP SERPL CALCULATED.3IONS-SCNC: 10 MMOL/L (ref 5–18)
AST SERPL W P-5'-P-CCNC: 26 U/L (ref 0–40)
BILIRUB SERPL-MCNC: 0.4 MG/DL (ref 0–1)
BUN SERPL-MCNC: 20 MG/DL (ref 8–22)
CALCIUM SERPL-MCNC: 9.8 MG/DL (ref 8.5–10.5)
CHLORIDE BLD-SCNC: 105 MMOL/L (ref 98–107)
CHOLEST SERPL-MCNC: 237 MG/DL
CO2 SERPL-SCNC: 26 MMOL/L (ref 22–31)
CREAT SERPL-MCNC: 0.85 MG/DL (ref 0.7–1.3)
ERYTHROCYTE [DISTWIDTH] IN BLOOD BY AUTOMATED COUNT: 12.3 % (ref 10–15)
FASTING STATUS PATIENT QL REPORTED: YES
GFR SERPL CREATININE-BSD FRML MDRD: >90 ML/MIN/1.73M2
GLUCOSE BLD-MCNC: 97 MG/DL (ref 70–125)
HCT VFR BLD AUTO: 44.2 % (ref 40–53)
HDLC SERPL-MCNC: 63 MG/DL
HGB BLD-MCNC: 15.1 G/DL (ref 13.3–17.7)
HIV 1+2 AB+HIV1 P24 AG SERPL QL IA: NEGATIVE
LDLC SERPL CALC-MCNC: 161 MG/DL
MCH RBC QN AUTO: 29.7 PG (ref 26.5–33)
MCHC RBC AUTO-ENTMCNC: 34.2 G/DL (ref 31.5–36.5)
MCV RBC AUTO: 87 FL (ref 78–100)
PLATELET # BLD AUTO: 227 10E3/UL (ref 150–450)
POTASSIUM BLD-SCNC: 4.6 MMOL/L (ref 3.5–5)
PROT SERPL-MCNC: 7.4 G/DL (ref 6–8)
RBC # BLD AUTO: 5.09 10E6/UL (ref 4.4–5.9)
SODIUM SERPL-SCNC: 141 MMOL/L (ref 136–145)
TRIGL SERPL-MCNC: 65 MG/DL
WBC # BLD AUTO: 5 10E3/UL (ref 4–11)

## 2021-12-24 PROCEDURE — 80061 LIPID PANEL: CPT | Performed by: FAMILY MEDICINE

## 2021-12-24 PROCEDURE — 86803 HEPATITIS C AB TEST: CPT | Performed by: FAMILY MEDICINE

## 2021-12-24 PROCEDURE — 99395 PREV VISIT EST AGE 18-39: CPT | Performed by: FAMILY MEDICINE

## 2021-12-24 PROCEDURE — 80053 COMPREHEN METABOLIC PANEL: CPT | Performed by: FAMILY MEDICINE

## 2021-12-24 PROCEDURE — 87389 HIV-1 AG W/HIV-1&-2 AB AG IA: CPT | Performed by: FAMILY MEDICINE

## 2021-12-24 PROCEDURE — 85027 COMPLETE CBC AUTOMATED: CPT | Performed by: FAMILY MEDICINE

## 2021-12-24 PROCEDURE — 82306 VITAMIN D 25 HYDROXY: CPT | Performed by: FAMILY MEDICINE

## 2021-12-24 PROCEDURE — 36415 COLL VENOUS BLD VENIPUNCTURE: CPT | Performed by: FAMILY MEDICINE

## 2021-12-24 ASSESSMENT — MIFFLIN-ST. JEOR: SCORE: 1678.66

## 2021-12-24 NOTE — PROGRESS NOTES
SUBJECTIVE:   CC: See Blakely is an 36 year old male who presents for preventative health visit.       Patient has been advised of split billing requirements and indicates understanding: Yes  Healthy Habits:     Getting at least 3 servings of Calcium per day:  Yes    Bi-annual eye exam:  Yes    Dental care twice a year:  NO    Sleep apnea or symptoms of sleep apnea:  Excessive snoring    Diet:  Regular (no restrictions)    Frequency of exercise:  None    Taking medications regularly:  Yes    Medication side effects:  None    PHQ-2 Total Score: 0    Additional concerns today:  No        Today's PHQ-2 Score:   PHQ-2 ( 1999 Pf[employer]) 12/24/2021   Q1: Little interest or pleasure in doing things 0   Q2: Feeling down, depressed or hopeless 0   PHQ-2 Score 0   Q1: Little interest or pleasure in doing things Not at all   Q2: Feeling down, depressed or hopeless Not at all   PHQ-2 Score 0       Abuse: Current or Past(Physical, Sexual or Emotional)- No  Do you feel safe in your environment? Yes        Social History     Tobacco Use     Smoking status: Never Smoker     Smokeless tobacco: Never Used   Substance Use Topics     Alcohol use: Yes     If you drink alcohol do you typically have >3 drinks per day or >7 drinks per week? No    NEVER SMOKER  ALCOHOL 2 DRINKS PER WEEK    Alcohol Use 12/24/2021   Prescreen: >3 drinks/day or >7 drinks/week? No   Prescreen: >3 drinks/day or >7 drinks/week? -   No flowsheet data found.                 Review of Systems  CONSTITUTIONAL: NEGATIVE for fever, chills, change in weight  INTEGUMENTARY/SKIN: NEGATIVE for worrisome rashes, moles or lesions  EYES: NEGATIVE for vision changes or irritation  ENT: NEGATIVE for ear, mouth and throat problems  RESP: NEGATIVE for significant cough or SOB  CV: NEGATIVE for chest pain, palpitations or peripheral edema  GI: NEGATIVE for nausea, abdominal pain, heartburn, or change in bowel habits   male: negative for dysuria, hematuria, decreased urinary  "stream, erectile dysfunction, urethral discharge  MUSCULOSKELETAL: NEGATIVE for significant arthralgias or myalgia  NEURO: NEGATIVE for weakness, dizziness or paresthesias  PSYCHIATRIC: NEGATIVE for changes in mood or affect  ITCHING IN GROIN CLEARED UP  SL RINGING IN THE EARS CHRONICALLY    OBJECTIVE:   /76 (BP Location: Left arm, Patient Position: Sitting, Cuff Size: Adult Regular)   Pulse 76   Temp (!) 96.2  F (35.7  C) (Oral)   Resp 16   Ht 1.81 m (5' 11.25\")   Wt 72.8 kg (160 lb 6.4 oz)   BMI 22.21 kg/m      Physical Exam  GENERAL: healthy, alert and no distress  EYES: Eyes grossly normal to inspection, PERRL and conjunctivae and sclerae normal  HENT: ear canals and TM's normal, nose and mouth without ulcers or lesions  NECK: no adenopathy, no asymmetry, masses, or scars and thyroid normal to palpation  RESP: lungs clear to auscultation - no rales, rhonchi or wheezes  CV: regular rate and rhythm, normal S1 S2, no S3 or S4, no murmur, click or rub, no peripheral edema and peripheral pulses strong  ABDOMEN: soft, nontender, no hepatosplenomegaly, no masses and bowel sounds normal   (male): no testicular nodules or masses;  No inguinal hernia  MS: no gross musculoskeletal defects noted, no edema  SKIN: no suspicious lesions or rashes on waist up check  NEURO: Normal strength and tone, mentation intact and speech normal  PSYCH: mentation appears normal, affect normal/bright      ASSESSMENT/PLAN:       ICD-10-CM    1. Healthcare maintenance  Z00.00 REVIEW OF HEALTH MAINTENANCE PROTOCOL ORDERS     Lipid panel     Comprehensive metabolic panel (BMP + Alb, Alk Phos, ALT, AST, Total. Bili, TP)     CBC with platelets     Vitamin D Deficiency   2. Need for hepatitis C screening test  Z11.59 Hepatitis C Screen Reflex to HCV RNA Quant and Genotype   3. Encounter for screening for HIV  Z11.4 HIV Antigen Antibody Combo       Patient has been advised of split billing requirements and indicates understanding: " "Yes  COUNSELING:   Reviewed preventive health counseling, as reflected in patient instructions       Regular exercise       Healthy diet/nutrition    Estimated body mass index is 22.21 kg/m  as calculated from the following:    Height as of this encounter: 1.81 m (5' 11.25\").    Weight as of this encounter: 72.8 kg (160 lb 6.4 oz).         He reports that he has never smoked. He has never used smokeless tobacco.          Bryant Decker MD  [facility]  "

## 2021-12-24 NOTE — LETTER
January 2, 2022      See Blakely  [address] [facility] [address]        Dear ,  We are writing to inform you of your test results.    Hi Tru:  Your universal screening for HIV and Hep C were NEGATIVE.  Your cholesterol and LDL (the BAD STUFF)  were mildly elevated.  I would recommend a diet low in saturated fat.  Your HDL (the GOOD STUFF) was great and is protective against heart disease.  Your blood sugar and remaining labs were all normal.  It was a pleasure seeing you in the clinic recently.  Happy New Year!    Resulted Orders   Hepatitis C Screen Reflex to HCV RNA Quant and Genotype   Result Value Ref Range    Hepatitis C Antibody Nonreactive Nonreactive    Narrative    Assay performance characteristics have not been established for newborns, infants, and children.   HIV Antigen Antibody Combo   Result Value Ref Range    HIV Antigen Antibody Combo Negative Negative   Lipid panel   Result Value Ref Range    Cholesterol 237 (H) <=199 mg/dL    Triglycerides 65 <=149 mg/dL    Direct Measure HDL 63 >=40 mg/dL      Comment:      HDL Cholesterol Reference Range:     0-2 years:   No reference ranges established for patients under 2 years old  at [facility] for lipid analytes.    2-8 years:  Greater than 45 mg/dL     18 years and older:   Female: Greater than or equal to 50 mg/dL   Male:   Greater than or equal to 40 mg/dL    LDL Cholesterol Calculated 161 (H) <=129 mg/dL    Patient Fasting > 8hrs? Yes    Comprehensive metabolic panel (BMP + Alb, Alk Phos, ALT, AST, Total. Bili, TP)   Result Value Ref Range    Sodium 141 136 - 145 mmol/L    Potassium 4.6 3.5 - 5.0 mmol/L    Chloride 105 98 - 107 mmol/L    Carbon Dioxide (CO2) 26 22 - 31 mmol/L    Anion Gap 10 5 - 18 mmol/L    Urea Nitrogen 20 8 - 22 mg/dL    Creatinine 0.85 0.70 - 1.30 mg/dL    Calcium 9.8 8.5 - 10.5 mg/dL    Glucose 97 70 - 125 mg/dL    Alkaline Phosphatase 68 45 - 120 U/L    AST 26 0 - 40 U/L    ALT 31 0 - 45 U/L     Protein Total 7.4 6.0 - 8.0 g/dL    Albumin 4.5 3.5 - 5.0 g/dL    Bilirubin Total 0.4 0.0 - 1.0 mg/dL    GFR Estimate >90 >60 mL/min/1.73m2      Comment:      Effective December 21, 2021 eGFRcr in adults is calculated using the 2021 CKD-EPI creatinine equation which includes age and gender (Hardik et al., NE, DOI: 10.1056/NULMqk1112414)   CBC with platelets   Result Value Ref Range    WBC Count 5.0 4.0 - 11.0 10e3/uL    RBC Count 5.09 4.40 - 5.90 10e6/uL    Hemoglobin 15.1 13.3 - 17.7 g/dL    Hematocrit 44.2 40.0 - 53.0 %    MCV 87 78 - 100 fL    MCH 29.7 26.5 - 33.0 pg    MCHC 34.2 31.5 - 36.5 g/dL    RDW 12.3 10.0 - 15.0 %    Platelet Count 227 150 - 450 10e3/uL   Vitamin D Deficiency   Result Value Ref Range    Vitamin D, Total (25-Hydroxy) 36 30 - 80 ug/L    Narrative    Deficiency <10.0 ug/L  Insufficiency 10.0-29.9 ug/L  Sufficiency 30.0-80.0 ug/L  Toxicity (possible) >100.0 ug/L        If you have any questions or concerns, please call the clinic at the number listed above.       Sincerely,      Bryant Decker MD

## 2021-12-24 NOTE — LETTER
December 31, 2021      See Blakely  [address] [facility] [address]        Dear ,    We are writing to inform you of your test results.        Resulted Orders   Hepatitis C Screen Reflex to HCV RNA Quant and Genotype   Result Value Ref Range    Hepatitis C Antibody Nonreactive Nonreactive    Narrative    Assay performance characteristics have not been established for newborns, infants, and children.   HIV Antigen Antibody Combo   Result Value Ref Range    HIV Antigen Antibody Combo Negative Negative   Lipid panel   Result Value Ref Range    Cholesterol 237 (H) <=199 mg/dL    Triglycerides 65 <=149 mg/dL    Direct Measure HDL 63 >=40 mg/dL      Comment:      HDL Cholesterol Reference Range:     0-2 years:   No reference ranges established for patients under 2 years old  at [facility] for lipid analytes.    2-8 years:  Greater than 45 mg/dL     18 years and older:   Female: Greater than or equal to 50 mg/dL   Male:   Greater than or equal to 40 mg/dL    LDL Cholesterol Calculated 161 (H) <=129 mg/dL    Patient Fasting > 8hrs? Yes    Comprehensive metabolic panel (BMP + Alb, Alk Phos, ALT, AST, Total. Bili, TP)   Result Value Ref Range    Sodium 141 136 - 145 mmol/L    Potassium 4.6 3.5 - 5.0 mmol/L    Chloride 105 98 - 107 mmol/L    Carbon Dioxide (CO2) 26 22 - 31 mmol/L    Anion Gap 10 5 - 18 mmol/L    Urea Nitrogen 20 8 - 22 mg/dL    Creatinine 0.85 0.70 - 1.30 mg/dL    Calcium 9.8 8.5 - 10.5 mg/dL    Glucose 97 70 - 125 mg/dL    Alkaline Phosphatase 68 45 - 120 U/L    AST 26 0 - 40 U/L    ALT 31 0 - 45 U/L    Protein Total 7.4 6.0 - 8.0 g/dL    Albumin 4.5 3.5 - 5.0 g/dL    Bilirubin Total 0.4 0.0 - 1.0 mg/dL    GFR Estimate >90 >60 mL/min/1.73m2      Comment:      Effective December 21, 2021 eGFRcr in adults is calculated using the 2021 CKD-EPI creatinine equation which includes age and gender (Hardik et al., NEJM, DOI: 10.1056/YDGHwm1987473)   CBC with platelets   Result Value  Ref Range    WBC Count 5.0 4.0 - 11.0 10e3/uL    RBC Count 5.09 4.40 - 5.90 10e6/uL    Hemoglobin 15.1 13.3 - 17.7 g/dL    Hematocrit 44.2 40.0 - 53.0 %    MCV 87 78 - 100 fL    MCH 29.7 26.5 - 33.0 pg    MCHC 34.2 31.5 - 36.5 g/dL    RDW 12.3 10.0 - 15.0 %    Platelet Count 227 150 - 450 10e3/uL   Vitamin D Deficiency   Result Value Ref Range    Vitamin D, Total (25-Hydroxy) 36 30 - 80 ug/L    Narrative    Deficiency <10.0 ug/L  Insufficiency 10.0-29.9 ug/L  Sufficiency 30.0-80.0 ug/L  Toxicity (possible) >100.0 ug/L        If you have any questions or concerns, please call the clinic at the number listed above.       Sincerely,      Bryant Decker MD

## 2021-12-26 LAB
DEPRECATED CALCIDIOL+CALCIFEROL SERPL-MC: 36 UG/L (ref 30–80)
HCV AB SERPL QL IA: NONREACTIVE

## 2022-02-11 ENCOUNTER — E-VISIT (OUTPATIENT)
Dept: URGENT CARE | Facility: CLINIC | Age: 37
End: 2022-02-11

## 2022-02-11 ENCOUNTER — TELEPHONE (OUTPATIENT)
Dept: FAMILY MEDICINE | Facility: CLINIC | Age: 37
End: 2022-02-11
Payer: COMMERCIAL

## 2022-02-11 ENCOUNTER — OFFICE VISIT (OUTPATIENT)
Dept: FAMILY MEDICINE | Facility: CLINIC | Age: 37
End: 2022-02-11
Payer: COMMERCIAL

## 2022-02-11 VITALS
HEART RATE: 74 BPM | SYSTOLIC BLOOD PRESSURE: 121 MMHG | RESPIRATION RATE: 16 BRPM | WEIGHT: 160.6 LBS | DIASTOLIC BLOOD PRESSURE: 81 MMHG | BODY MASS INDEX: 22.24 KG/M2

## 2022-02-11 DIAGNOSIS — B37.49 CANDIDIASIS OF SCROTUM: Primary | ICD-10-CM

## 2022-02-11 DIAGNOSIS — Z53.9 ERRONEOUS ENCOUNTER--DISREGARD: Primary | ICD-10-CM

## 2022-02-11 PROCEDURE — 99213 OFFICE O/P EST LOW 20 MIN: CPT | Performed by: FAMILY MEDICINE

## 2022-02-11 RX ORDER — FLUCONAZOLE 150 MG/1
150 TABLET ORAL ONCE
Qty: 2 TABLET | Refills: 0 | Status: SHIPPED | OUTPATIENT
Start: 2022-02-11 | End: 2022-02-11

## 2022-02-11 NOTE — PROGRESS NOTES
DIAGNOSIS:  Encounter Diagnosis   Name Primary?     Candidiasis of scrotum Yes        PLAN:     Fluconazole 150 mg times one dose and may repeat in one week if necessary  (rx for #2 tabs)    Pt will update via [facility] [facility] if sxs do not completely resolve.          HPI:  Baby and his wife have yeast and are on fluconazole.  Groin rash and on the ankles.  Arm pits get itchy.  Can get an itchy scalp          Current Outpatient Medications   Medication     fluconazole (DIFLUCAN) 150 MG tablet     No current facility-administered medications for this visit.       Pmh: reviewed  Psh: reviewed  Allergy:  reviewed      EXAM:    /81   Pulse 74   Resp 16   Wt 72.8 kg (160 lb 9.6 oz)   BMI 22.24 kg/m    GEN:   ALERT, NAD, ORIENTED TIMES THREE  SKIN: NO RASH , ULCERS OR LESIONS NOTED  EXT: WITHOUT EDEMA/SWELLING    Last Comprehensive Metabolic Panel:  Sodium   Date Value Ref Range Status   12/24/2021 141 136 - 145 mmol/L Final     Potassium   Date Value Ref Range Status   12/24/2021 4.6 3.5 - 5.0 mmol/L Final     Chloride   Date Value Ref Range Status   12/24/2021 105 98 - 107 mmol/L Final     Carbon Dioxide (CO2)   Date Value Ref Range Status   12/24/2021 26 22 - 31 mmol/L Final     Anion Gap   Date Value Ref Range Status   12/24/2021 10 5 - 18 mmol/L Final     Glucose   Date Value Ref Range Status   12/24/2021 97 70 - 125 mg/dL Final     Urea Nitrogen   Date Value Ref Range Status   12/24/2021 20 8 - 22 mg/dL Final     Creatinine   Date Value Ref Range Status   12/24/2021 0.85 0.70 - 1.30 mg/dL Final   11/14/2006 1.09 0.80 - 1.50 mg/dL Final     GFR Estimate   Date Value Ref Range Status   12/24/2021 >90 >60 mL/min/1.73m2 Final     Comment:     Effective December 21, 2021 eGFRcr in adults is calculated using the 2021 CKD-EPI creatinine equation which includes age and gender (Hardik ac al., NEJM, DOI: 10.1056/WHDVpz9997095)   11/11/2019 >60 >60 mL/min/1.73m2 Final   11/14/2006 90 >60 mL/min/1.7m2 Final     Calcium   Date  Value Ref Range Status   12/24/2021 9.8 8.5 - 10.5 mg/dL Final     Bilirubin Total   Date Value Ref Range Status   12/24/2021 0.4 0.0 - 1.0 mg/dL Final     Alkaline Phosphatase   Date Value Ref Range Status   12/24/2021 68 45 - 120 U/L Final     ALT   Date Value Ref Range Status   12/24/2021 31 0 - 45 U/L Final     AST   Date Value Ref Range Status   12/24/2021 26 0 - 40 U/L Final

## 2022-02-11 NOTE — TELEPHONE ENCOUNTER
Patient called back stating that he might have done the E-visit wrong. Patient states that both his wife and baby are being treated for Thrush. Patient states that he doesn't know if he should be getting treated for it also. Patient states that he has itching and rashes all over his armpits, ankles, and groin area. Patient has tried OTC creams and jock itch creams. Nothing has helped over the last 2 weeks.     Patient is wondering if he could get something prescribed through the E-visit or if patient should make an appt to be seen.    Patient states that both his wife and baby are being treated with Fluconazole    Ok to leave detailed message

## 2022-12-26 ENCOUNTER — HEALTH MAINTENANCE LETTER (OUTPATIENT)
Age: 37
End: 2022-12-26

## 2023-04-22 ENCOUNTER — HEALTH MAINTENANCE LETTER (OUTPATIENT)
Age: 38
End: 2023-04-22

## 2023-06-30 ENCOUNTER — OFFICE VISIT (OUTPATIENT)
Dept: FAMILY MEDICINE | Facility: CLINIC | Age: 38
End: 2023-06-30
Payer: COMMERCIAL

## 2023-06-30 VITALS
BODY MASS INDEX: 21.19 KG/M2 | WEIGHT: 151.4 LBS | TEMPERATURE: 98 F | HEART RATE: 81 BPM | OXYGEN SATURATION: 97 % | HEIGHT: 71 IN | RESPIRATION RATE: 16 BRPM | DIASTOLIC BLOOD PRESSURE: 64 MMHG | SYSTOLIC BLOOD PRESSURE: 101 MMHG

## 2023-06-30 DIAGNOSIS — R73.01 ELEVATED FASTING BLOOD SUGAR: ICD-10-CM

## 2023-06-30 DIAGNOSIS — Z00.00 HEALTHCARE MAINTENANCE: Primary | ICD-10-CM

## 2023-06-30 DIAGNOSIS — Z30.09 ENCOUNTER FOR VASECTOMY COUNSELING: ICD-10-CM

## 2023-06-30 DIAGNOSIS — R09.81 NASAL CONGESTION: ICD-10-CM

## 2023-06-30 DIAGNOSIS — B07.9 VIRAL WARTS, UNSPECIFIED TYPE: ICD-10-CM

## 2023-06-30 DIAGNOSIS — R06.83 SNORING: ICD-10-CM

## 2023-06-30 DIAGNOSIS — R21 RASH: ICD-10-CM

## 2023-06-30 LAB
ALBUMIN SERPL BCG-MCNC: 5 G/DL (ref 3.5–5.2)
ALP SERPL-CCNC: 71 U/L (ref 40–129)
ALT SERPL W P-5'-P-CCNC: 14 U/L (ref 0–70)
ANION GAP SERPL CALCULATED.3IONS-SCNC: 10 MMOL/L (ref 7–15)
AST SERPL W P-5'-P-CCNC: 20 U/L (ref 0–45)
BILIRUB SERPL-MCNC: 0.5 MG/DL
BUN SERPL-MCNC: 18.9 MG/DL (ref 6–20)
CALCIUM SERPL-MCNC: 9.8 MG/DL (ref 8.6–10)
CHLORIDE SERPL-SCNC: 104 MMOL/L (ref 98–107)
CHOLEST SERPL-MCNC: 223 MG/DL
CREAT SERPL-MCNC: 0.98 MG/DL (ref 0.67–1.17)
DEPRECATED HCO3 PLAS-SCNC: 26 MMOL/L (ref 22–29)
ERYTHROCYTE [DISTWIDTH] IN BLOOD BY AUTOMATED COUNT: 12.5 % (ref 10–15)
GFR SERPL CREATININE-BSD FRML MDRD: >90 ML/MIN/1.73M2
GLUCOSE SERPL-MCNC: 110 MG/DL (ref 70–99)
HCT VFR BLD AUTO: 43 % (ref 40–53)
HDLC SERPL-MCNC: 63 MG/DL
HGB BLD-MCNC: 14.7 G/DL (ref 13.3–17.7)
LDLC SERPL CALC-MCNC: 147 MG/DL
MCH RBC QN AUTO: 29.9 PG (ref 26.5–33)
MCHC RBC AUTO-ENTMCNC: 34.2 G/DL (ref 31.5–36.5)
MCV RBC AUTO: 88 FL (ref 78–100)
NONHDLC SERPL-MCNC: 160 MG/DL
PLATELET # BLD AUTO: 210 10E3/UL (ref 150–450)
POTASSIUM SERPL-SCNC: 4.2 MMOL/L (ref 3.4–5.3)
PROT SERPL-MCNC: 7.6 G/DL (ref 6.4–8.3)
RBC # BLD AUTO: 4.91 10E6/UL (ref 4.4–5.9)
SODIUM SERPL-SCNC: 140 MMOL/L (ref 136–145)
TRIGL SERPL-MCNC: 66 MG/DL
WBC # BLD AUTO: 4.3 10E3/UL (ref 4–11)

## 2023-06-30 PROCEDURE — 82306 VITAMIN D 25 HYDROXY: CPT | Performed by: FAMILY MEDICINE

## 2023-06-30 PROCEDURE — 83036 HEMOGLOBIN GLYCOSYLATED A1C: CPT | Performed by: FAMILY MEDICINE

## 2023-06-30 PROCEDURE — 80061 LIPID PANEL: CPT | Performed by: FAMILY MEDICINE

## 2023-06-30 PROCEDURE — 99213 OFFICE O/P EST LOW 20 MIN: CPT | Mod: 25 | Performed by: FAMILY MEDICINE

## 2023-06-30 PROCEDURE — 36415 COLL VENOUS BLD VENIPUNCTURE: CPT | Performed by: FAMILY MEDICINE

## 2023-06-30 PROCEDURE — 85027 COMPLETE CBC AUTOMATED: CPT | Performed by: FAMILY MEDICINE

## 2023-06-30 PROCEDURE — 80053 COMPREHEN METABOLIC PANEL: CPT | Performed by: FAMILY MEDICINE

## 2023-06-30 PROCEDURE — 99395 PREV VISIT EST AGE 18-39: CPT | Performed by: FAMILY MEDICINE

## 2023-06-30 ASSESSMENT — ENCOUNTER SYMPTOMS
WEAKNESS: 0
COUGH: 0
PARESTHESIAS: 0
FREQUENCY: 0
HEADACHES: 0
ABDOMINAL PAIN: 0
CONSTIPATION: 0
SORE THROAT: 0
SHORTNESS OF BREATH: 0
DYSURIA: 0
ARTHRALGIAS: 0
HEMATURIA: 0
PALPITATIONS: 0
FEVER: 0
HEARTBURN: 0
NAUSEA: 0
MYALGIAS: 0
CHILLS: 0
NERVOUS/ANXIOUS: 0
JOINT SWELLING: 0
EYE PAIN: 0
DIZZINESS: 0
DIARRHEA: 0
HEMATOCHEZIA: 0

## 2023-06-30 NOTE — PATIENT INSTRUCTIONS
Referral to Urology for a vasectomy    Referral to Dermatology for skin issues and finger wart    To see ENT for ongoing snoring issues and nasal congestion    Fasting labs    Colonoscopy at age 39 (a bit early due to father's colon cancer at age 60)

## 2023-06-30 NOTE — LETTER
July 1, 2023      Tru Blakely  [address] [facility] [address]        Dear ,  We are writing to inform you of your test results.    Barry Ott:  Your cholesterol and LDL are mildly elevated.  Try to limit the saturated fats in your diet.  Recheck the fasting lipids in one year.    Your fasting blood sugar was slightly elevated (into the pre-diabetic range).  I have added an A1C to assess your blood sugar control over the past 3 months.    The remaining labs are normal.    Resulted Orders   Lipid panel   Result Value Ref Range    Cholesterol 223 (H) <200 mg/dL    Triglycerides 66 <150 mg/dL    Direct Measure HDL 63 >=40 mg/dL    LDL Cholesterol Calculated 147 (H) <=100 mg/dL    Non HDL Cholesterol 160 (H) <130 mg/dL    Narrative    Cholesterol  Desirable:  <200 mg/dL    Triglycerides  Normal:  Less than 150 mg/dL  Borderline High:  150-199 mg/dL  High:  200-499 mg/dL  Very High:  Greater than or equal to 500 mg/dL    Direct Measure HDL  Female:  Greater than or equal to 50 mg/dL   Male:  Greater than or equal to 40 mg/dL    LDL Cholesterol  Desirable:  <100mg/dL  Above Desirable:  100-129 mg/dL   Borderline High:  130-159 mg/dL   High:  160-189 mg/dL   Very High:  >= 190 mg/dL    Non HDL Cholesterol  Desirable:  130 mg/dL  Above Desirable:  130-159 mg/dL  Borderline High:  160-189 mg/dL  High:  190-219 mg/dL  Very High:  Greater than or equal to 220 mg/dL   Comprehensive metabolic panel (BMP + Alb, Alk Phos, ALT, AST, Total. Bili, TP)   Result Value Ref Range    Sodium 140 136 - 145 mmol/L    Potassium 4.2 3.4 - 5.3 mmol/L    Chloride 104 98 - 107 mmol/L    Carbon Dioxide (CO2) 26 22 - 29 mmol/L    Anion Gap 10 7 - 15 mmol/L    Urea Nitrogen 18.9 6.0 - 20.0 mg/dL    Creatinine 0.98 0.67 - 1.17 mg/dL    Calcium 9.8 8.6 - 10.0 mg/dL    Glucose 110 (H) 70 - 99 mg/dL    Alkaline Phosphatase 71 40 - 129 U/L    AST 20 0 - 45 U/L      Comment:      Reference intervals for this test were updated on  6/12/2023 to more accurately reflect our healthy population. There may be differences in the flagging of prior results with similar values performed with this method. Interpretation of those prior results can be made in the context of the updated reference intervals.    ALT 14 0 - 70 U/L      Comment:      Reference intervals for this test were updated on 6/12/2023 to more accurately reflect our healthy population. There may be differences in the flagging of prior results with similar values performed with this method. Interpretation of those prior results can be made in the context of the updated reference intervals.      Protein Total 7.6 6.4 - 8.3 g/dL    Albumin 5.0 3.5 - 5.2 g/dL    Bilirubin Total 0.5 <=1.2 mg/dL    GFR Estimate >90 >60 mL/min/1.73m2   CBC with platelets   Result Value Ref Range    WBC Count 4.3 4.0 - 11.0 10e3/uL    RBC Count 4.91 4.40 - 5.90 10e6/uL    Hemoglobin 14.7 13.3 - 17.7 g/dL    Hematocrit 43.0 40.0 - 53.0 %    MCV 88 78 - 100 fL    MCH 29.9 26.5 - 33.0 pg    MCHC 34.2 31.5 - 36.5 g/dL    RDW 12.5 10.0 - 15.0 %    Platelet Count 210 150 - 450 10e3/uL   Vitamin D Deficiency   Result Value Ref Range    Vitamin D, Total (25-Hydroxy) 34 20 - 75 ug/L    Narrative    Season, race, dietary intake, and treatment affect the concentration of 25-hydroxy-Vitamin D. Values may decrease during winter months and increase during summer months. Values 20-29 ug/L may indicate Vitamin D insufficiency and values <20 ug/L may indicate Vitamin D deficiency.    Vitamin D determination is routinely performed by an immunoassay specific for 25 hydroxyvitamin D3.  If an individual is on vitamin D2(ergocalciferol) supplementation, please specify 25 OH vitamin D2 and D3 level determination by LCMSMS test [id number].         If you have any questions or concerns, please call the clinic at the number listed above.       Sincerely,      Bryant Decker MD

## 2023-06-30 NOTE — LETTER
July 3, 2023      Tru Blakely  [address] [facility] [address]        Dear ,  We are writing to inform you of your test results.    Barry Ott:  This is an addendum to your recent lab letter.  Your A1C was normal, indicating no diabetes or pre-diabetes.  We will recheck the fasting blood sugar and A1C in a year.     Resulted Orders   Lipid panel   Result Value Ref Range    Cholesterol 223 (H) <200 mg/dL    Triglycerides 66 <150 mg/dL    Direct Measure HDL 63 >=40 mg/dL    LDL Cholesterol Calculated 147 (H) <=100 mg/dL    Non HDL Cholesterol 160 (H) <130 mg/dL    Narrative    Cholesterol  Desirable:  <200 mg/dL    Triglycerides  Normal:  Less than 150 mg/dL  Borderline High:  150-199 mg/dL  High:  200-499 mg/dL  Very High:  Greater than or equal to 500 mg/dL    Direct Measure HDL  Female:  Greater than or equal to 50 mg/dL   Male:  Greater than or equal to 40 mg/dL    LDL Cholesterol  Desirable:  <100mg/dL  Above Desirable:  100-129 mg/dL   Borderline High:  130-159 mg/dL   High:  160-189 mg/dL   Very High:  >= 190 mg/dL    Non HDL Cholesterol  Desirable:  130 mg/dL  Above Desirable:  130-159 mg/dL  Borderline High:  160-189 mg/dL  High:  190-219 mg/dL  Very High:  Greater than or equal to 220 mg/dL   Comprehensive metabolic panel (BMP + Alb, Alk Phos, ALT, AST, Total. Bili, TP)   Result Value Ref Range    Sodium 140 136 - 145 mmol/L    Potassium 4.2 3.4 - 5.3 mmol/L    Chloride 104 98 - 107 mmol/L    Carbon Dioxide (CO2) 26 22 - 29 mmol/L    Anion Gap 10 7 - 15 mmol/L    Urea Nitrogen 18.9 6.0 - 20.0 mg/dL    Creatinine 0.98 0.67 - 1.17 mg/dL    Calcium 9.8 8.6 - 10.0 mg/dL    Glucose 110 (H) 70 - 99 mg/dL    Alkaline Phosphatase 71 40 - 129 U/L    AST 20 0 - 45 U/L      Comment:      Reference intervals for this test were updated on 6/12/2023 to more accurately reflect our healthy population. There may be differences in the flagging of prior results with similar values performed with this method.  Interpretation of those prior results can be made in the context of the updated reference intervals.    ALT 14 0 - 70 U/L      Comment:      Reference intervals for this test were updated on 6/12/2023 to more accurately reflect our healthy population. There may be differences in the flagging of prior results with similar values performed with this method. Interpretation of those prior results can be made in the context of the updated reference intervals.      Protein Total 7.6 6.4 - 8.3 g/dL    Albumin 5.0 3.5 - 5.2 g/dL    Bilirubin Total 0.5 <=1.2 mg/dL    GFR Estimate >90 >60 mL/min/1.73m2   CBC with platelets   Result Value Ref Range    WBC Count 4.3 4.0 - 11.0 10e3/uL    RBC Count 4.91 4.40 - 5.90 10e6/uL    Hemoglobin 14.7 13.3 - 17.7 g/dL    Hematocrit 43.0 40.0 - 53.0 %    MCV 88 78 - 100 fL    MCH 29.9 26.5 - 33.0 pg    MCHC 34.2 31.5 - 36.5 g/dL    RDW 12.5 10.0 - 15.0 %    Platelet Count 210 150 - 450 10e3/uL   Vitamin D Deficiency   Result Value Ref Range    Vitamin D, Total (25-Hydroxy) 34 20 - 75 ug/L    Narrative    Season, race, dietary intake, and treatment affect the concentration of 25-hydroxy-Vitamin D. Values may decrease during winter months and increase during summer months. Values 20-29 ug/L may indicate Vitamin D insufficiency and values <20 ug/L may indicate Vitamin D deficiency.    Vitamin D determination is routinely performed by an immunoassay specific for 25 hydroxyvitamin D3.  If an individual is on vitamin D2(ergocalciferol) supplementation, please specify 25 OH vitamin D2 and D3 level determination by LCMSMS test [id number].     Hemoglobin A1c   Result Value Ref Range    Hemoglobin A1C 5.4 0.0 - 5.6 %      Comment:      Normal <5.7%   Prediabetes 5.7-6.4%    Diabetes 6.5% or higher     Note: Adopted from ADA consensus guidelines.       If you have any questions or concerns, please call the clinic at the number listed above.       Sincerely,      Bryant Decker MD

## 2023-06-30 NOTE — PROGRESS NOTES
SUBJECTIVE:   CC: Tru is an 37 year old who presents for preventative health visit.     Chief Complaint   Patient presents with     Physical     Referral for snoring, Referral for vasectomy, dry skin on face, hearing concerns         6/30/2023     7:38 AM   Additional Questions   Roomed by Areli JENKINS LPN     Healthy Habits:     Getting at least 3 servings of Calcium per day:  NO    Bi-annual eye exam:  Yes    Dental care twice a year:  NO    Sleep apnea or symptoms of sleep apnea:  Excessive snoring    Diet:  Other    Frequency of exercise:  1 day/week    Duration of exercise:  Less than 15 minutes    Taking medications regularly:  Yes    Medication side effects:  None        6/30/2023     7:50 AM   Hearing Screen Results   Comments (C&TC Required): Left Ear 6000 Hz - 25 dB   Right Ear- 1000Hz/40dB Pass   Right Ear - 500Hz/25dB Pass   Right Ear - 1000Hz/20dB Pass   Right Ear - 2000Hz/20dB Pass   Right Ear - 4000Hz/20dB Pass   Left Ear - 500Hz/25dB Pass   Left Ear - 1000Hz/20dB Pass   Left Ear - 2000Hz/20dB Pass   Left Ear - 4000Hz/20dB Pass   Hearing Screen Results Pass     Today's PHQ-2 Score:       6/30/2023     7:34 AM   PHQ-2 ( 1999 [employer])   Q1: Little interest or pleasure in doing things 0   Q2: Feeling down, depressed or hopeless 0   PHQ-2 Score 0   Q1: Little interest or pleasure in doing things Not at all   Q2: Feeling down, depressed or hopeless Not at all   PHQ-2 Score 0     Social History     Tobacco Use     Smoking status: Never     Smokeless tobacco: Never   Substance Use Topics     Alcohol use: Yes    alcohol:  3 drinks in a week        6/30/2023     7:34 AM   Alcohol Use   Prescreen: >3 drinks/day or >7 drinks/week? No       Last PSA: No results found for: PSA        Reviewed and updated as needed this visit by clinical staff   Tobacco  Allergies  Meds              Reviewed and updated as needed this visit by Provider      Review of Systems   Constitutional: Negative for chills and fever.   HENT:  "Negative for congestion, ear pain, hearing loss and sore throat.    Eyes: Negative for pain and visual disturbance.   Respiratory: Negative for cough and shortness of breath.    Cardiovascular: Negative for chest pain, palpitations and peripheral edema.   Gastrointestinal: Negative for abdominal pain, constipation, diarrhea, heartburn, hematochezia and nausea.   Genitourinary: Negative for dysuria, frequency, genital sores, hematuria, impotence, penile discharge and urgency.   Musculoskeletal: Negative for arthralgias, joint swelling and myalgias.   Skin: Positive for rash.   Neurological: Negative for dizziness, weakness, headaches and paresthesias.   Psychiatric/Behavioral: Negative for mood changes. The patient is not nervous/anxious.      Has some tinnitus  Some itchy facial itching on top lip by mustache  Battling wart on his finger  Snoring bad.  Sleep test a year or two ago.    OBJECTIVE:   /64   Pulse 81   Temp 98  F (36.7  C) (Oral)   Resp 16   Ht 1.81 m (5' 11.25\")   Wt 68.7 kg (151 lb 6.4 oz)   SpO2 97%   BMI 20.97 kg/m      Physical Exam  GENERAL: healthy, alert and no distress  EYES: Eyes grossly normal to inspection, PERRL and conjunctivae and sclerae normal  HENT: ear canals and TM's normal, nose and mouth without ulcers or lesions  NECK: no adenopathy, no asymmetry, masses, or scars and thyroid normal to palpation  RESP: lungs clear to auscultation - no rales, rhonchi or wheezes  CV: regular rate and rhythm, normal S1 S2, no S3 or S4, no murmur, click or rub, no peripheral edema and peripheral pulses strong  ABDOMEN: soft, nontender, no hepatosplenomegaly, no masses and bowel sounds normal  MS: no gross musculoskeletal defects noted, no edema  :  EXAM declined  SKIN: no suspicious lesions or rashes  NEURO: Normal strength and tone, mentation intact and speech normal  PSYCH: mentation appears normal, affect normal/bright      ASSESSMENT/PLAN:       ICD-10-CM    1. Healthcare maintenance "  Z00.00 Lipid panel     Comprehensive metabolic panel (BMP + Alb, Alk Phos, ALT, AST, Total. Bili, TP)     CBC with platelets     Vitamin D Deficiency      2. Encounter for vasectomy counseling  Z30.09 Adult Urology  Referral      3. Rash, facial R21 Adult Dermatology Referral      4. Viral warts, unspecified type, finger B07.9 Adult Dermatology Referral      5. Snoring  R06.83 Adult ENT  Referral      6. Nasal congestion  R09.81 Adult ENT  Referral          PLAN:        Referral to Urology for a vasectomy    Referral to Dermatology for skin issues and finger wart    To see ENT for ongoing snoring issues and nasal congestion    Fasting labs    Colonoscopy at age 39 (a bit early due to father's colon cancer at age 60)      Patient has been advised of split billing requirements and indicates understanding: Yes      COUNSELING:   Reviewed preventive health counseling, as reflected in patient instructions       Regular exercise       Healthy diet/nutrition        He reports that he has never smoked. He has never used smokeless tobacco.        Bryant Decker MD  [facility]

## 2023-07-01 LAB — DEPRECATED CALCIDIOL+CALCIFEROL SERPL-MC: 34 UG/L (ref 20–75)

## 2023-07-03 LAB — HBA1C MFR BLD: 5.4 % (ref 0–5.6)

## 2023-08-17 ENCOUNTER — VIRTUAL VISIT (OUTPATIENT)
Dept: UROLOGY | Facility: CLINIC | Age: 38
End: 2023-08-17
Attending: FAMILY MEDICINE
Payer: COMMERCIAL

## 2023-08-17 ENCOUNTER — MYC MEDICAL ADVICE (OUTPATIENT)
Dept: UROLOGY | Facility: CLINIC | Age: 38
End: 2023-08-17

## 2023-08-17 DIAGNOSIS — Z30.09 ENCOUNTER FOR VASECTOMY COUNSELING: ICD-10-CM

## 2023-08-17 PROCEDURE — 99203 OFFICE O/P NEW LOW 30 MIN: CPT | Mod: VID | Performed by: UROLOGY

## 2023-08-17 NOTE — PROGRESS NOTES
Tru is a 37 year old who is being evaluated via a billable video visit.      How would you like to obtain your AVS? MyChart  If the video visit is dropped, the invitation should be resent by: Text to cell phone: [phone]  Will anyone else be joining your video visit? No              Subjective   Tru is a 37 year old, presenting for the following health issues:  Video Visit    HPI     Patient is a 37 y.o. male who was requested to be seen by Dr. Decker for vasectomy consult.  He has 3 kids.      Review of Systems   Constitutional, HEENT, cardiovascular, pulmonary, gi and gu systems are negative, except as otherwise noted.      Objective           Vitals:  No vitals were obtained today due to virtual visit.    Physical Exam   GENERAL: Healthy, alert and no distress  EYES: Eyes grossly normal to inspection.  No discharge or erythema, or obvious scleral/conjunctival abnormalities.  RESP: No audible wheeze, cough, or visible cyanosis.  No visible retractions or increased work of breathing.    SKIN: Visible skin clear. No significant rash, abnormal pigmentation or lesions.  NEURO: Cranial nerves grossly intact.  Mentation and speech appropriate for age.  PSYCH: Mentation appears normal, affect normal/bright, judgement and insight intact, normal speech and appearance well-groomed.        Discussed    That vasectomy is permanent method of birth control.    That vasectomy can fail due to recanalization of the vas even many months/years later.    That he needs 2 negative sperm checks to be considered sterile    That there are other methods that are not permanent and also that the sperm can be frozen for later use.    How the technique is performed, risks of infection, bleeding, damage to the testes vessels and testes atrophy    Long term complication such as chronic and difficult to treat testes pain and questionable increase incidence of prostate cancer    That the procedure can be done at the clinic or hospital  OR        Plan:    Stop Aspirin  Will schedule Vasectomy in the future        Video-Visit Details    Type of service:  Video Visit     Originating Location (pt. Location): Home    Distant Location (provider location):  Off-site  Platform used for Video Visit: Mirtha

## 2023-08-24 NOTE — TELEPHONE ENCOUNTER
Writer called and left [facility] for patient to call back.  Awaiting call back.    Dee DOE RN Urology 8/24/2023 2:38 PM

## 2023-09-19 NOTE — PROGRESS NOTES
"CHIEF COMPLAINT: Patient presents with:  Nasal Congestion: He has been  snoring and has nasal congestion and this has been for 8 years, he states it has not been bad enough to be seen for. He now wants to be able to get better sleep. He completed an at home sleep study and that came back stating he did not have sleep apnea, but he knows he does snore loudly. He feels tired more than he should and recently he has started tracking his sleep with his watch. Does not use nasal sprays and has not been tested for allergies          HISTORY OF PRESENT ILLNESS    Tru was seen at the behest of Bryant Decker for snoring and nasal congestion    He was worked up for sleep apnea (home study).  He feels like there is \"phlegmn\" hear his tonsils.  He also reports frontal headaches regularly.  He also notes hoarseness of voice when talks.  Works a  for an .  No history of allergies.  Occasional heartburn.  Does not drink sodas.   History of migraine headaches and transient vertigo.     Referral note:        Referral to Urology for a vasectomy     Referral to Dermatology for skin issues and finger wart     To see ENT for ongoing snoring issues and nasal congestion         REVIEW OF SYSTEMS    Review of Systems as per HPI and PMHx, otherwise 10 system review system are negative.       ALLERGIES    Patient has no known allergies.    CURRENT MEDICATIONS      Current Outpatient Medications:     azelastine (ASTELIN) 0.1 % nasal spray, 2 sprays each nostril 1-2x daily as needed for nasal congestion (use nightly for first 2 week), Disp: 30 mL, Rfl: 11    magnesium oxide (MAG-OX) 400 MG tablet, Take 1 tablet (400 mg) by mouth daily for 360 days, Disp: 90 tablet, Rfl: 3     PAST MEDICAL HISTORY    PAST MEDICAL HISTORY: No past medical history on file.    PAST SURGICAL HISTORY    PAST SURGICAL HISTORY: No past surgical history on file.    FAMILY  HISTORY    FAMILY HISTORY:   Family History   Problem " Relation Age of Onset    Sleep Apnea Father     Colon Cancer Father 62.00    Throat cancer Father     Snoring Father        SOCIAL HISTORY    SOCIAL HISTORY:   Social History     Tobacco Use    Smoking status: Never    Smokeless tobacco: Never   Substance Use Topics    Alcohol use: Yes        PHYSICAL EXAM    HEAD: Normal appearance and symmetry:  No cutaneous lesions.      NECK:  supple     EARS:    Right:   TM nl   LEFT:  TM nl     EYES:  EOMI    CN VII/XII:  intact     NOSE:     Dorsum:   straight  Septum:  deviated right   Mucosa:  moist  ITH:  3+ inflamed        ORAL CAVITY/OROPHARYNX:     Lips:  Normal.  Tongue: normal, midline  Mucosa:   no lesions  Tonsils:  2+  Mi     NECK:  Trachea:  midline.              Thyroid:  normal              Adenopathy:  none        NEURO:   Alert and Oriented     GAIT AND STATION:  normal     RESPIRATORY:   Symmetry and Respiratory effort     PSYCH:  Normal mood and affect     SKIN:   warm and dry         IMPRESSION:    Encounter Diagnoses   Name Primary?    Snoring     Nasal congestion     Nasal septal deviation Yes    Sinus headache     Migrainous vertigo           RECOMMENDATIONS:      Orders Placed This Encounter   Procedures    CT Sinus w/o Contrast      Orders Placed This Encounter   Medications    azelastine (ASTELIN) 0.1 % nasal spray     Si sprays each nostril 1-2x daily as needed for nasal congestion (use nightly for first 2 week)     Dispense:  30 mL     Refill:  11    magnesium oxide (MAG-OX) 400 MG tablet     Sig: Take 1 tablet (400 mg) by mouth daily for 360 days     Dispense:  90 tablet     Refill:  3      Migraine diet  Follow up in [place] office for CT review and nasal endoscopy.

## 2023-09-20 ENCOUNTER — OFFICE VISIT (OUTPATIENT)
Dept: OTOLARYNGOLOGY | Facility: CLINIC | Age: 38
End: 2023-09-20
Attending: FAMILY MEDICINE
Payer: COMMERCIAL

## 2023-09-20 DIAGNOSIS — R06.83 SNORING: ICD-10-CM

## 2023-09-20 DIAGNOSIS — J34.2 NASAL SEPTAL DEVIATION: Primary | ICD-10-CM

## 2023-09-20 DIAGNOSIS — R51.9 SINUS HEADACHE: ICD-10-CM

## 2023-09-20 DIAGNOSIS — R09.81 NASAL CONGESTION: ICD-10-CM

## 2023-09-20 DIAGNOSIS — G43.109 MIGRAINOUS VERTIGO: ICD-10-CM

## 2023-09-20 PROCEDURE — 99204 OFFICE O/P NEW MOD 45 MIN: CPT | Performed by: OTOLARYNGOLOGY

## 2023-09-20 RX ORDER — MAGNESIUM OXIDE 400 MG/1
400 TABLET ORAL DAILY
Qty: 90 TABLET | Refills: 3 | Status: SHIPPED | OUTPATIENT
Start: 2023-09-20 | End: 2024-09-14

## 2023-09-20 RX ORDER — AZELASTINE 1 MG/ML
SPRAY, METERED NASAL
Qty: 30 ML | Refills: 11 | Status: SHIPPED | OUTPATIENT
Start: 2023-09-20

## 2023-09-20 NOTE — PATIENT INSTRUCTIONS
"Migraine Diet       Food may play a significant role in the frequency of migraine. Although some migraine patients find that eating certain foods will provoke symptoms every single time, the effect of diet may be less obvious.    In general, the more \"trigger\" foods you consume, the more symptoms you may have. By avoiding these possible triggers, you can minimize your symptoms.     Eating regularly timed meals, avoiding hunger, avoiding dehydration, and avoiding skipping meals      Try following this list as strictly as possible for at least two months.     You may gradually add back your favorite foods one at a time, keeping track of your headaches as you do so.     Category  Foods to Avoid, Reduce, or Limit  Foods that are OK    Caffeine  No more than 2 servings / day. Do not vary the amount or timing from day to day. Coffee, tea, nora, Mountain Dew, Sunkist, certain medications (Anacin, Excedrin)  Decaffeinated coffee, herbal or green tea, caffeine-free sodas, fruit juice (see below)    Snacks / Desserts  Chocolate, nuts (peanuts, especially), peanut butter, seeds  Fruits listed below, sherbet, ice cream, cakes, pudding, Jello, sugar, jam, jelly, honey, hard candy, cookies made w/o chocolate or nuts    Alcohol  Avoid all, especially: ales, Burgundy, chianti, malted beers, red wine, anna marie, vermouth. Note: some medications contain alcohol (Nyquil)  Non-alcoholic beverages    Dairy  Aged cheeses: Brie, blue, boursault, brick, Camembert, cheddar, Emmenlalaer, gouda, mozzarella, Parmesan, Provolone, Deng, Roquefort, stilton, Swiss, etc.   Buttermilk, chocolate milk, sour cream   Eggs and yogurt should be limited to 2-3 times per week  Other cheeses: American, cottage, cream cheese, farmer, ricotta, Velveeta.   Milk,   Egg substitute    Cereals & Grains  Fresh breads and yeast products, fresh bagels, fresh doughnuts, yeast extracts, bush's yeast, sourdough   (*freezing bread may inactivate yeast)  Commercial " "breads (white, wheat, rye, multi-grain, Italian), English Muffins, crackers, rye, toast, bagels, potatoes, rice, spaghetti, noodles, hot or dried cereals, oatmeal    Meats  Aged, canned, cured, or processed meats (bologna, pepperoni, salami, other pre-packaged deli meats), pickled meats or fish, salted or dried meats or poultry, hot dogs, sausages, jerky  Fresh / unprocessed meats, poultry, fish, lamb, pork, veal, lamb, tuna    MSG (monosodium glutamate)  Avoid glutamate in all its multiple forms: MSG, \"natural flavoring,\" \"flavor enhancer,\" etc.   Soy sauce, foods containing \"hydrolyzed protein products\" or \"autolyzed yeast\", canned soups, bouillon cubes, Accent, meat tenderizers, seasoned salts. Pickled, preserved or marinated foods  Salt and other spices, butter, margarine, cooking oil, white vinegar, salad dressing (small amounts)    Sweeteners  Aspartame (Equal, Nutrasweet) (somewhat controversial)  Sucrose (sugar), high fructose corn syrup, sucralose (Splenda), saccharin (Sweet 'n Low)    Vegetables  Pole or broad beans, lima beans, Italian beans, lentils, snow peas, javid beans, Navy beans, zamora beans, pea pods, sauerkraut, garbanzo beans, onions, olives, pickles  Asparagus, beets, broccoli, carrots, corn, lettuce, pumpkins, spinach, squash, string beans, tomatoes- all those not listed    Fruit  Avocados, figs, papaya, passion fruit, raisins, red plums. Limit bananas and citrus fruit & juice (orange, lemon, lime, grapefruit, tangerines) to   cup per day  Apples, berries, peaches, pears, prunes, fruit cocktail    Mixed Dishes  Frozen meals/Microwave ready      "

## 2023-09-20 NOTE — NURSING NOTE
Sino-Nasal Outcome Test (SNOT - 22)    1. Need to Blow Nose: (P) Very mild  2. Nasal Blockage: (P) Very mild  3. Sneezing: (P) None  4. Runny Nose: (P) None  5. Cough: (P) None  6. Post-nasal discharge: (P) Moderate  7. Thick nasal discharge: (P) Mild or slight  8. Ear fullness: (P) Mild or slight  9. Dizziness: (P) Mild or slight  10. Ear Pain: (P) None  11. Facial pain/pressure: (P) Very mild  12. Decreased Sense of Smell/Taste: (P) None  13. Difficulty falling asleep: (P) None  14. Wake up at night: (P) Very mild  15. Lack of a good night's sleep: (P) Moderate  16. Wake up tired: (P) Moderate  17. Fatigue: (P) Moderate  18. Reduced Productivity: (P) Very mild  19. Reduced Concentration: (P) Very mild  20. Frustrated/restless/irritable: (P) None  21. Sad: (P) None  22. Embarrassed: (P) None    Total Score: (P) 24    COPYRIGHT 1996. [facility] [place] . [facility],[place]     Regine Eugene on 9/20/2023 at 11:13 AM

## 2023-09-20 NOTE — LETTER
"    9/20/2023         RE: See Blakely  [address] [address]        Dear Colleague,    Thank you for referring your patient, See Blakely, to the [facility]. Please see a copy of my visit note below.    CHIEF COMPLAINT: Patient presents with:  Nasal Congestion: He has been  snoring and has nasal congestion and this has been for 8 years, he states it has not been bad enough to be seen for. He now wants to be able to get better sleep. He completed an at home sleep study and that came back stating he did not have sleep apnea, but he knows he does snore loudly. He feels tired more than he should and recently he has started tracking his sleep with his watch. Does not use nasal sprays and has not been tested for allergies          HISTORY OF PRESENT ILLNESS    Tru was seen at the behest of Bryant Decker for snoring and nasal congestion    He was worked up for sleep apnea (home study).  He feels like there is \"phlegmn\" hear his tonsils.  He also reports frontal headaches regularly.  He also notes hoarseness of voice when talks.  Works a  for an .  No history of allergies.  Occasional heartburn.  Does not drink sodas.   History of migraine headaches and transient vertigo.     Referral note:        Referral to Urology for a vasectomy     Referral to Dermatology for skin issues and finger wart     To see ENT for ongoing snoring issues and nasal congestion         REVIEW OF SYSTEMS    Review of Systems as per HPI and PMHx, otherwise 10 system review system are negative.       ALLERGIES    Patient has no known allergies.    CURRENT MEDICATIONS      Current Outpatient Medications:      azelastine (ASTELIN) 0.1 % nasal spray, 2 sprays each nostril 1-2x daily as needed for nasal congestion (use nightly for first 2 week), Disp: 30 mL, Rfl: 11     magnesium oxide (MAG-OX) 400 MG tablet, Take 1 tablet (400 mg) by mouth daily for 360 " days, Disp: 90 tablet, Rfl: 3     PAST MEDICAL HISTORY    PAST MEDICAL HISTORY: No past medical history on file.    PAST SURGICAL HISTORY    PAST SURGICAL HISTORY: No past surgical history on file.    FAMILY  HISTORY    FAMILY HISTORY:   Family History   Problem Relation Age of Onset     Sleep Apnea Father      Colon Cancer Father 62.00     Throat cancer Father      Snoring Father        SOCIAL HISTORY    SOCIAL HISTORY:   Social History     Tobacco Use     Smoking status: Never     Smokeless tobacco: Never   Substance Use Topics     Alcohol use: Yes        PHYSICAL EXAM    HEAD: Normal appearance and symmetry:  No cutaneous lesions.      NECK:  supple     EARS:    Right:   TM nl   LEFT:  TM nl     EYES:  EOMI    CN VII/XII:  intact     NOSE:     Dorsum:   straight  Septum:  deviated right   Mucosa:  moist  ITH:  3+ inflamed        ORAL CAVITY/OROPHARYNX:     Lips:  Normal.  Tongue: normal, midline  Mucosa:   no lesions  Tonsils:  2+  Mi     NECK:  Trachea:  midline.              Thyroid:  normal              Adenopathy:  none        NEURO:   Alert and Oriented     GAIT AND STATION:  normal     RESPIRATORY:   Symmetry and Respiratory effort     PSYCH:  Normal mood and affect     SKIN:   warm and dry         IMPRESSION:    Encounter Diagnoses   Name Primary?     Snoring      Nasal congestion      Nasal septal deviation Yes     Sinus headache      Migrainous vertigo           RECOMMENDATIONS:      Orders Placed This Encounter   Procedures     CT Sinus w/o Contrast      Orders Placed This Encounter   Medications     azelastine (ASTELIN) 0.1 % nasal spray     Si sprays each nostril 1-2x daily as needed for nasal congestion (use nightly for first 2 week)     Dispense:  30 mL     Refill:  11     magnesium oxide (MAG-OX) 400 MG tablet     Sig: Take 1 tablet (400 mg) by mouth daily for 360 days     Dispense:  90 tablet     Refill:  3      Migraine diet  Follow up in [place] office for CT review and nasal endoscopy.          Again, thank you for allowing me to participate in the care of your patient.        Sincerely,        Fazal Briggs MD

## 2023-12-08 ENCOUNTER — TRANSFERRED RECORDS (OUTPATIENT)
Dept: HEALTH INFORMATION MANAGEMENT | Facility: CLINIC | Age: 38
End: 2023-12-08
Payer: COMMERCIAL

## 2023-12-15 ENCOUNTER — HOSPITAL ENCOUNTER (OUTPATIENT)
Dept: CT IMAGING | Facility: HOSPITAL | Age: 38
Discharge: HOME | End: 2023-12-15
Attending: OTOLARYNGOLOGY | Admitting: OTOLARYNGOLOGY
Payer: COMMERCIAL

## 2023-12-15 DIAGNOSIS — R51.9 SINUS HEADACHE: ICD-10-CM

## 2023-12-15 PROCEDURE — 70486 CT MAXILLOFACIAL W/O DYE: CPT

## 2023-12-21 ENCOUNTER — OFFICE VISIT (OUTPATIENT)
Dept: OTOLARYNGOLOGY | Facility: CLINIC | Age: 38
End: 2023-12-21
Payer: COMMERCIAL

## 2023-12-21 DIAGNOSIS — J32.0 CHRONIC MAXILLARY SINUSITIS: ICD-10-CM

## 2023-12-21 DIAGNOSIS — J34.2 NASAL SEPTAL DEVIATION: Primary | ICD-10-CM

## 2023-12-21 DIAGNOSIS — J32.1 CHRONIC FRONTAL SINUSITIS: ICD-10-CM

## 2023-12-21 PROCEDURE — 99214 OFFICE O/P EST MOD 30 MIN: CPT | Performed by: OTOLARYNGOLOGY

## 2023-12-21 NOTE — NURSING NOTE
Sino-Nasal Outcome Test (SNOT - 22)    1. Need to Blow Nose: (P) Mild or slight  2. Nasal Blockage: (P) Mild or slight  3. Sneezing: (P) None  4. Runny Nose: (P) None  5. Cough: (P) Very mild  6. Post-nasal discharge: (P) Moderate  7. Thick nasal discharge: (P) Very mild  8. Ear fullness: (P) Very mild  9. Dizziness: (P) None  10. Ear Pain: (P) None  11. Facial pain/pressure: (P) Very mild  12. Decreased Sense of Smell/Taste: (P) None  13. Difficulty falling asleep: (P) None  14. Wake up at night: (P) None  15. Lack of a good night's sleep: (P) Moderate  16. Wake up tired: (P) Mild or slight  17. Fatigue: (P) Very mild  18. Reduced Productivity: (P) None  19. Reduced Concentration: (P) Very mild  20. Frustrated/restless/irritable: (P) None  21. Sad: (P) None  22. Embarrassed: (P) None    Total Score: (P) 18    COPYRIGHT 1996. [facility] [place],[place]     Heena Mancuso MA on 12/21/2023 at 1:52 PM

## 2023-12-21 NOTE — PATIENT INSTRUCTIONS
Recommend:    Nasal septoplasty (Balloon assist)  Turbinate reduction  Balloon dilation of frontal and maxillary sinuses  Cryoablation of posterior nasal tissue (clarifix)

## 2023-12-21 NOTE — LETTER
12/21/2023         RE: See Blakely  [address] [address]        Dear Colleague,    Thank you for referring your patient, See Blakely, to the [facility]. Please see a copy of my visit note below.    CHIEF COMPLAINT:  Patient presents with:  RECHECK: 3 Month Follow up Nasal Congestion, CT review, slightly better as far as nasal drainage everything else is about the same from last visit, SNOT total 18         HISTORY OF PRESENT ILLNESS    Tru was seen in follow up after previous Visit date not found visit for CT SINUS REVIEW    CT SINUS 12/15/2023      Rightward bowing/spurring of the nasal septum which contacts the right inferior turbinate.     2.  Moderate nonaggressive mucosal thickening in the left maxillary sinus and mild nonaggressive mucosal thickening in the right maxillary sinus, otherwise the paranasal sinuses and temporal bone structures are well-aerated.        REVIEW OF SYSTEMS    Review of Systems: a 10-system review is reviewed at this encounter.  See scanned document.       No Known Allergies        PHYSICAL EXAM:        HEAD: Normal appearance and symmetry:  No cutaneous lesions.        NOSE:    Dorsum:   straight  Septum: off crest to right  Turbinates: 3+ pale, boggy       ORAL CAVITY/OROPHARYNX:    Lips:  Normal.     NECK:  Trachea:  midline     NEURO:   Alert and Oriented    GAIT AND STATION:  normal     RESPIRATORY:   Symmetry and Respiratory effort    PSYCH:   normal mood and affect    SKIN:  warm and dry         IMPRESSION:   Encounter Diagnoses   Name Primary?     Nasal septal deviation Yes     Chronic maxillary sinusitis      Chronic frontal sinusitis             RECOMMENDATIONS:      Recommend:    Nasal septoplasty (Balloon assist)  Turbinate reduction  Balloon dilation of frontal and maxillary sinuses  Cryoablation of posterior nasal tissue (clarifix)      Again, thank you for allowing me to participate in the care of your patient.         Sincerely,        Fazal Briggs MD

## 2023-12-21 NOTE — PROGRESS NOTES
CHIEF COMPLAINT:  Patient presents with:  RECHECK: 3 Month Follow up Nasal Congestion, CT review, slightly better as far as nasal drainage everything else is about the same from last visit, SNOT total 18         HISTORY OF PRESENT ILLNESS    Tru was seen in follow up after previous Visit date not found visit for CT SINUS REVIEW    CT SINUS 12/15/2023      Rightward bowing/spurring of the nasal septum which contacts the right inferior turbinate.     2.  Moderate nonaggressive mucosal thickening in the left maxillary sinus and mild nonaggressive mucosal thickening in the right maxillary sinus, otherwise the paranasal sinuses and temporal bone structures are well-aerated.        REVIEW OF SYSTEMS    Review of Systems: a 10-system review is reviewed at this encounter.  See scanned document.       No Known Allergies        PHYSICAL EXAM:        HEAD: Normal appearance and symmetry:  No cutaneous lesions.        NOSE:    Dorsum:   straight  Septum: off crest to right  Turbinates: 3+ pale, boggy       ORAL CAVITY/OROPHARYNX:    Lips:  Normal.     NECK:  Trachea:  midline     NEURO:   Alert and Oriented    GAIT AND STATION:  normal     RESPIRATORY:   Symmetry and Respiratory effort    PSYCH:   normal mood and affect    SKIN:  warm and dry         IMPRESSION:   Encounter Diagnoses   Name Primary?    Nasal septal deviation Yes    Chronic maxillary sinusitis     Chronic frontal sinusitis             RECOMMENDATIONS:      Recommend:    Nasal septoplasty (Balloon assist)  Turbinate reduction  Balloon dilation of frontal and maxillary sinuses  Cryoablation of posterior nasal tissue (clarifix)

## 2024-03-26 ENCOUNTER — ANCILLARY PROCEDURE (OUTPATIENT)
Dept: GENERAL RADIOLOGY | Facility: CLINIC | Age: 39
End: 2024-03-26
Attending: FAMILY MEDICINE
Payer: COMMERCIAL

## 2024-03-26 ENCOUNTER — OFFICE VISIT (OUTPATIENT)
Dept: FAMILY MEDICINE | Facility: CLINIC | Age: 39
End: 2024-03-26
Payer: COMMERCIAL

## 2024-03-26 VITALS
TEMPERATURE: 98.7 F | OXYGEN SATURATION: 98 % | SYSTOLIC BLOOD PRESSURE: 120 MMHG | WEIGHT: 157.6 LBS | DIASTOLIC BLOOD PRESSURE: 77 MMHG | HEIGHT: 71 IN | RESPIRATION RATE: 16 BRPM | BODY MASS INDEX: 22.06 KG/M2 | HEART RATE: 81 BPM

## 2024-03-26 DIAGNOSIS — M53.3 PAIN IN THE COCCYX: Primary | ICD-10-CM

## 2024-03-26 DIAGNOSIS — M53.3 PAIN IN THE COCCYX: ICD-10-CM

## 2024-03-26 PROCEDURE — 72220 X-RAY EXAM SACRUM TAILBONE: CPT | Mod: TC | Performed by: RADIOLOGY

## 2024-03-26 PROCEDURE — 99213 OFFICE O/P EST LOW 20 MIN: CPT | Performed by: FAMILY MEDICINE

## 2024-03-26 NOTE — PROGRESS NOTES
Assessment/ Plan     1. Pain in the coccyx  Tru has had an achy sensation in his coccyx with prolonged sitting intermittently for the last 1 year.  X-ray is unremarkable.  We discussed conservative measures such as a donut pillow or similar type pillow to relieve the pressure off the coccyx, getting up and moving more frequently, possible physical therapy if things or not improving.  He will continue to monitor for now and follow-up if any worsening symptoms.  - XR Sacrum and Coccyx 2 Views; Future      Subjective:      See Blakely is a 38 year old male who presents for valuation of tailbone pain.  He is new to me today.  He states this been going on for over a year.  It is very intermittent, only happening when he sitting for prolonged periods of time.  He was at convention this weekend and was doing a lot of sitting on hard chairs and it started to bother him.  He states is not really a pain, it is more of an achy sensation.  Once he gets up and walks around it goes away.  He is not having any rectal pain, hemorrhoids, blood in the stool, etc.  It does not bother him any other times and when he is sitting.  If he is up and active and moving around he does not have any discomfort.  He denies any trauma when it for started to happen.  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    Relevant past medical, family, surgical, and social history reviewed with patient, unless noted in HPI, not pertinent for this visit.  Medications were discussed and reconciled.   Review of Systems   A 12 point comprehensive review of systems was negative except as noted.      Current Outpatient Medications   Medication Sig Dispense Refill    azelastine (ASTELIN) 0.1 % nasal spray 2 sprays each nostril 1-2x daily as needed for nasal congestion (use nightly for first 2 week) 30 mL 11    magnesium oxide (MAG-OX) 400 MG tablet Take 1 tablet (400 mg) by mouth daily for 360 days 90 tablet 3  "   metroNIDAZOLE (METROCREAM) 0.75 % external cream APPLY A THIN LAYER TO THE AFFECTED AREAS ON FACE 1-2X DAILY, ONGOING.           Objective:     /77   Pulse 81   Temp 98.7  F (37.1  C)   Resp 16   Ht 1.81 m (5' 11.25\")   Wt 71.5 kg (157 lb 9.6 oz)   SpO2 98%   BMI 21.83 kg/m      Body mass index is 21.83 kg/m .       General appearance: alert, appears stated age and cooperative     Back: I asked him to point to where he has discomfort.  He points to the very end of the coccyx.  No discomfort with palpation.    X-rays, personally reviewed: negative for bony abnormalities.    No results found for this or any previous visit (from the past 168 hour(s)).       This note has been dictated using voice recognition software. Any grammatical or context distortions are unintentional and inherent to the software  "

## 2024-04-29 ENCOUNTER — HOSPITAL ENCOUNTER (OUTPATIENT)
Dept: CT IMAGING | Facility: HOSPITAL | Age: 39
Discharge: HOME | End: 2024-04-29
Attending: OTOLARYNGOLOGY | Admitting: OTOLARYNGOLOGY
Payer: COMMERCIAL

## 2024-04-29 DIAGNOSIS — J34.2 NASAL SEPTAL DEVIATION: ICD-10-CM

## 2024-04-29 DIAGNOSIS — J32.0 CHRONIC MAXILLARY SINUSITIS: ICD-10-CM

## 2024-04-29 DIAGNOSIS — J32.1 CHRONIC FRONTAL SINUSITIS: ICD-10-CM

## 2024-04-29 PROCEDURE — 70486 CT MAXILLOFACIAL W/O DYE: CPT

## 2024-05-06 ENCOUNTER — OFFICE VISIT (OUTPATIENT)
Dept: OTOLARYNGOLOGY | Facility: CLINIC | Age: 39
End: 2024-05-06
Payer: COMMERCIAL

## 2024-05-06 DIAGNOSIS — R09.81 NASAL CONGESTION: ICD-10-CM

## 2024-05-06 DIAGNOSIS — J32.0 CHRONIC MAXILLARY SINUSITIS: Primary | ICD-10-CM

## 2024-05-06 DIAGNOSIS — R09.82 PND (POST-NASAL DRIP): ICD-10-CM

## 2024-05-06 DIAGNOSIS — J32.1 CHRONIC FRONTAL SINUSITIS: ICD-10-CM

## 2024-05-06 PROCEDURE — 99214 OFFICE O/P EST MOD 30 MIN: CPT | Performed by: OTOLARYNGOLOGY

## 2024-05-06 RX ORDER — PREDNISONE 10 MG/1
10 TABLET ORAL 2 TIMES DAILY
Qty: 20 TABLET | Refills: 0 | Status: SHIPPED | OUTPATIENT
Start: 2024-05-06 | End: 2024-05-16

## 2024-05-06 RX ORDER — SULFAMETHOXAZOLE/TRIMETHOPRIM 800-160 MG
1 TABLET ORAL 2 TIMES DAILY
Qty: 48 TABLET | Refills: 0 | Status: SHIPPED | OUTPATIENT
Start: 2024-05-06 | End: 2024-05-30

## 2024-05-06 RX ORDER — ACETAMINOPHEN 80 MG
1 TABLET,CHEWABLE ORAL DAILY
Qty: 90 PACKET | Refills: 0 | Status: SHIPPED | OUTPATIENT
Start: 2024-05-06 | End: 2024-08-04

## 2024-05-06 RX ORDER — IPRATROPIUM BROMIDE 21 UG/1
2 SPRAY, METERED NASAL 3 TIMES DAILY PRN
Qty: 30 ML | Refills: 3 | Status: SHIPPED | OUTPATIENT
Start: 2024-05-06 | End: 2024-06-05

## 2024-05-06 NOTE — LETTER
5/6/2024         RE: See Blakely  [address] [address]        Dear Colleague,    Thank you for referring your patient, See Blakely, to the [facility]. Please see a copy of my visit note below.    FOLLOW UP VISIT NOTE      HISTORY OF PRESENT ILLNESS    Tru was seen in follow up after previous 12/21/2023 visit for recheck.      1. Mild nonobstructive mucosal thickening within the paranasal sinuses. No air-fluid levels to suggest acute sinusitis. He continues to have sinus congestion and poor sleep despite medical therapy.   No recent allergy testing.       Sino-Nasal Outcome Test (SNOT - 22)    1. Need to Blow Nose: (P) Very mild  2. Nasal Blockage: (P) Very mild  3. Sneezing: (P) Mild or slight  4. Runny Nose: (P) Mild or slight  5. Cough: (P) Very mild  6. Post-nasal discharge: (P) Moderate  7. Thick nasal discharge: (P) None  8. Ear fullness: (P) Very mild  9. Dizziness: (P) Very mild  10. Ear Pain: (P) None  11. Facial pain/pressure: (P) None  12. Decreased Sense of Smell/Taste: (P) None  13. Difficulty falling asleep: (P) None  14. Wake up at night: (P) None  15. Lack of a good night's sleep: (P) Moderate  16. Wake up tired: (P) Moderate  17. Fatigue: (P) Very mild  18. Reduced Productivity: (P) None  19. Reduced Concentration: (P) Very mild  20. Frustrated/restless/irritable: (P) None  21. Sad: (P) None  22. Embarrassed: (P) None    Total Score: (P) 20    COPYRIGHT 1996. [facility] [place]         REVIEW OF SYSTEMS    Review of Systems: a 10-system review is reviewed at this encounter.  See scanned document.       No Known Allergies        PHYSICAL EXAM:        HEAD: Normal appearance and symmetry:  No cutaneous lesions.          Dorsum:   straight  Septum: off crest to right with large bony spur  ITH: 3-4+ (good response to decongestants)         ORAL CAVITY/OROPHARYNX:    Lips:  Normal.     NECK:  Trachea:  midline     NEURO:    Alert and Oriented    GAIT AND STATION:  normal     RESPIRATORY:   Symmetry and Respiratory effort    PSYCH:   normal mood and affect    SKIN:  warm and dry         IMPRESSION:   Encounter Diagnoses   Name Primary?     Chronic maxillary sinusitis Yes     Chronic frontal sinusitis      Nasal congestion          RECOMMENDATIONS:    Orders Placed This Encounter   Procedures     Adult Allergy/Asthma  Referral      Orders Placed This Encounter   Medications     sodium chloride-sodium bicarb (SINUS WASH NETI POT) 2300-700 MG KIT     Sig: Spray 1 packet in nostril daily for 90 days Change bottle every 3 months     Dispense:  90 packet     Refill:  0     sulfamethoxazole-trimethoprim (BACTRIM DS) 800-160 MG tablet     Sig: Take 1 tablet by mouth 2 times daily for 24 days     Dispense:  48 tablet     Refill:  0     predniSONE (DELTASONE) 10 MG tablet     Sig: Take 1 tablet (10 mg) by mouth 2 times daily for 10 days     Dispense:  20 tablet     Refill:  0     Return visit in mid fall to discuss BA septoplasty, RF turbinate reduction, Frontal and Maxillary sinuplasty, and cryoabation of posterior nasal tissue.       Again, thank you for allowing me to participate in the care of your patient.        Sincerely,        Fazal Briggs MD

## 2024-05-06 NOTE — PROGRESS NOTES
FOLLOW UP VISIT NOTE      HISTORY OF PRESENT ILLNESS    Tru was seen in follow up after previous 12/21/2023 visit for recheck.      1. Mild nonobstructive mucosal thickening within the paranasal sinuses. No air-fluid levels to suggest acute sinusitis. He continues to have sinus congestion and poor sleep despite medical therapy.   No recent allergy testing.       Sino-Nasal Outcome Test (SNOT - 22)    1. Need to Blow Nose: (P) Very mild  2. Nasal Blockage: (P) Very mild  3. Sneezing: (P) Mild or slight  4. Runny Nose: (P) Mild or slight  5. Cough: (P) Very mild  6. Post-nasal discharge: (P) Moderate  7. Thick nasal discharge: (P) None  8. Ear fullness: (P) Very mild  9. Dizziness: (P) Very mild  10. Ear Pain: (P) None  11. Facial pain/pressure: (P) None  12. Decreased Sense of Smell/Taste: (P) None  13. Difficulty falling asleep: (P) None  14. Wake up at night: (P) None  15. Lack of a good night's sleep: (P) Moderate  16. Wake up tired: (P) Moderate  17. Fatigue: (P) Very mild  18. Reduced Productivity: (P) None  19. Reduced Concentration: (P) Very mild  20. Frustrated/restless/irritable: (P) None  21. Sad: (P) None  22. Embarrassed: (P) None    Total Score: (P) 20    COPYRIGHT 1996. [facility] [place] . [facility],[place]         REVIEW OF SYSTEMS    Review of Systems: a 10-system review is reviewed at this encounter.  See scanned document.       No Known Allergies        PHYSICAL EXAM:        HEAD: Normal appearance and symmetry:  No cutaneous lesions.          Dorsum:   straight  Septum: off crest to right with large bony spur  ITH: 3-4+ (good response to decongestants)         ORAL CAVITY/OROPHARYNX:    Lips:  Normal.     NECK:  Trachea:  midline     NEURO:   Alert and Oriented    GAIT AND STATION:  normal     RESPIRATORY:   Symmetry and Respiratory effort    PSYCH:   normal mood and affect    SKIN:  warm and dry         IMPRESSION:   Encounter Diagnoses   Name Primary?    Chronic maxillary sinusitis Yes     Chronic frontal sinusitis     Nasal congestion          RECOMMENDATIONS:    Orders Placed This Encounter   Procedures    Adult Allergy/Asthma  Referral      Orders Placed This Encounter   Medications    sodium chloride-sodium bicarb (SINUS WASH NETI POT) 2300-700 MG KIT     Sig: Spray 1 packet in nostril daily for 90 days Change bottle every 3 months     Dispense:  90 packet     Refill:  0    sulfamethoxazole-trimethoprim (BACTRIM DS) 800-160 MG tablet     Sig: Take 1 tablet by mouth 2 times daily for 24 days     Dispense:  48 tablet     Refill:  0    predniSONE (DELTASONE) 10 MG tablet     Sig: Take 1 tablet (10 mg) by mouth 2 times daily for 10 days     Dispense:  20 tablet     Refill:  0     Return visit in mid fall to discuss BA septoplasty, RF turbinate reduction, Frontal and Maxillary sinuplasty, and cryoabation of posterior nasal tissue.

## 2024-05-06 NOTE — NURSING NOTE
Sino-Nasal Outcome Test (SNOT - 22)    1. Need to Blow Nose: (P) Very mild  2. Nasal Blockage: (P) Very mild  3. Sneezing: (P) Mild or slight  4. Runny Nose: (P) Mild or slight  5. Cough: (P) Very mild  6. Post-nasal discharge: (P) Moderate  7. Thick nasal discharge: (P) None  8. Ear fullness: (P) Very mild  9. Dizziness: (P) Very mild  10. Ear Pain: (P) None  11. Facial pain/pressure: (P) None  12. Decreased Sense of Smell/Taste: (P) None  13. Difficulty falling asleep: (P) None  14. Wake up at night: (P) None  15. Lack of a good night's sleep: (P) Moderate  16. Wake up tired: (P) Moderate  17. Fatigue: (P) Very mild  18. Reduced Productivity: (P) None  19. Reduced Concentration: (P) Very mild  20. Frustrated/restless/irritable: (P) None  21. Sad: (P) None  22. Embarrassed: (P) None    Total Score: (P) 20    COPYRIGHT 1996. [facility] [place]

## 2024-05-08 ENCOUNTER — OFFICE VISIT (OUTPATIENT)
Dept: FAMILY MEDICINE | Facility: CLINIC | Age: 39
End: 2024-05-08
Payer: COMMERCIAL

## 2024-05-08 VITALS
BODY MASS INDEX: 21.73 KG/M2 | SYSTOLIC BLOOD PRESSURE: 113 MMHG | HEART RATE: 79 BPM | RESPIRATION RATE: 14 BRPM | WEIGHT: 155.2 LBS | TEMPERATURE: 98.7 F | OXYGEN SATURATION: 98 % | HEIGHT: 71 IN | DIASTOLIC BLOOD PRESSURE: 78 MMHG

## 2024-05-08 DIAGNOSIS — Z30.2 ENCOUNTER FOR VASECTOMY: ICD-10-CM

## 2024-05-08 DIAGNOSIS — M53.3 PAIN IN THE COCCYX: ICD-10-CM

## 2024-05-08 DIAGNOSIS — Z00.00 ROUTINE GENERAL MEDICAL EXAMINATION AT A HEALTH CARE FACILITY: Primary | ICD-10-CM

## 2024-05-08 LAB
ALBUMIN SERPL BCG-MCNC: 5 G/DL (ref 3.5–5.2)
ALP SERPL-CCNC: 72 U/L (ref 40–150)
ALT SERPL W P-5'-P-CCNC: 19 U/L (ref 0–70)
ANION GAP SERPL CALCULATED.3IONS-SCNC: 12 MMOL/L (ref 7–15)
AST SERPL W P-5'-P-CCNC: 20 U/L (ref 0–45)
BILIRUB SERPL-MCNC: 0.3 MG/DL
BUN SERPL-MCNC: 16.1 MG/DL (ref 6–20)
CALCIUM SERPL-MCNC: 9.9 MG/DL (ref 8.6–10)
CHLORIDE SERPL-SCNC: 103 MMOL/L (ref 98–107)
CHOLEST SERPL-MCNC: 223 MG/DL
CREAT SERPL-MCNC: 1.09 MG/DL (ref 0.67–1.17)
DEPRECATED HCO3 PLAS-SCNC: 22 MMOL/L (ref 22–29)
EGFRCR SERPLBLD CKD-EPI 2021: 89 ML/MIN/1.73M2
ERYTHROCYTE [DISTWIDTH] IN BLOOD BY AUTOMATED COUNT: 12.8 % (ref 10–15)
FASTING STATUS PATIENT QL REPORTED: YES
FASTING STATUS PATIENT QL REPORTED: YES
GLUCOSE SERPL-MCNC: 125 MG/DL (ref 70–99)
HCT VFR BLD AUTO: 42.7 % (ref 40–53)
HDLC SERPL-MCNC: 67 MG/DL
HGB BLD-MCNC: 14.8 G/DL (ref 13.3–17.7)
LDLC SERPL CALC-MCNC: 149 MG/DL
MCH RBC QN AUTO: 29.8 PG (ref 26.5–33)
MCHC RBC AUTO-ENTMCNC: 34.7 G/DL (ref 31.5–36.5)
MCV RBC AUTO: 86 FL (ref 78–100)
NONHDLC SERPL-MCNC: 156 MG/DL
PLATELET # BLD AUTO: 210 10E3/UL (ref 150–450)
POTASSIUM SERPL-SCNC: 4.4 MMOL/L (ref 3.4–5.3)
PROT SERPL-MCNC: 7.6 G/DL (ref 6.4–8.3)
PSA SERPL DL<=0.01 NG/ML-MCNC: 0.82 NG/ML
RBC # BLD AUTO: 4.97 10E6/UL (ref 4.4–5.9)
SODIUM SERPL-SCNC: 137 MMOL/L (ref 135–145)
TRIGL SERPL-MCNC: 34 MG/DL
TSH SERPL DL<=0.005 MIU/L-ACNC: 1.27 UIU/ML (ref 0.3–4.2)
WBC # BLD AUTO: 4.2 10E3/UL (ref 4–11)

## 2024-05-08 PROCEDURE — 80061 LIPID PANEL: CPT | Performed by: PHYSICIAN ASSISTANT

## 2024-05-08 PROCEDURE — 84443 ASSAY THYROID STIM HORMONE: CPT | Performed by: PHYSICIAN ASSISTANT

## 2024-05-08 PROCEDURE — 99213 OFFICE O/P EST LOW 20 MIN: CPT | Mod: 25 | Performed by: PHYSICIAN ASSISTANT

## 2024-05-08 PROCEDURE — 36415 COLL VENOUS BLD VENIPUNCTURE: CPT | Performed by: PHYSICIAN ASSISTANT

## 2024-05-08 PROCEDURE — G0103 PSA SCREENING: HCPCS | Performed by: PHYSICIAN ASSISTANT

## 2024-05-08 PROCEDURE — 83036 HEMOGLOBIN GLYCOSYLATED A1C: CPT | Performed by: PHYSICIAN ASSISTANT

## 2024-05-08 PROCEDURE — 85027 COMPLETE CBC AUTOMATED: CPT | Performed by: PHYSICIAN ASSISTANT

## 2024-05-08 PROCEDURE — 80053 COMPREHEN METABOLIC PANEL: CPT | Performed by: PHYSICIAN ASSISTANT

## 2024-05-08 PROCEDURE — 99395 PREV VISIT EST AGE 18-39: CPT | Performed by: PHYSICIAN ASSISTANT

## 2024-05-08 SDOH — HEALTH STABILITY: PHYSICAL HEALTH: ON AVERAGE, HOW MANY DAYS PER WEEK DO YOU ENGAGE IN MODERATE TO STRENUOUS EXERCISE (LIKE A BRISK WALK)?: 3 DAYS

## 2024-05-08 ASSESSMENT — SOCIAL DETERMINANTS OF HEALTH (SDOH): HOW OFTEN DO YOU GET TOGETHER WITH FRIENDS OR RELATIVES?: NEVER

## 2024-05-08 NOTE — PROGRESS NOTES
Preventive Care Visit  [facility]  Caitlin Ruff PA-C, Physician Assistant - Medical  May 8, 2024      Assessment & Plan     Routine general medical examination at a health care facility  Labs updated.  Vaccines are up to date.  - PRIMARY CARE FOLLOW-UP SCHEDULING  - CBC with platelets  - Comprehensive metabolic panel  - Lipid panel reflex to direct LDL Fasting  - TSH with free T4 reflex  - CBC with platelets  - Comprehensive metabolic panel  - Lipid panel reflex to direct LDL Fasting  - TSH with free T4 reflex    Pain in the coccyx  Persistent issues with positional changes.  Suspect more MSK but given persistent pain- will check PSA and refer onto GI given his concern from colonoscopy.  - Prostate Specific Antigen Screen  - Adult GI  Referral - Consult Only  - Prostate Specific Antigen Screen    Encounter for vasectomy  - Adult Urology  Referral      Patient has been advised of split billing requirements and indicates understanding: Yes      Counseling  Appropriate preventive services were discussed with this patient, including applicable screening as appropriate for fall prevention, nutrition, physical activity, Tobacco-use cessation, weight loss and cognition.  Checklist reviewing preventive services available has been given to the patient.  Reviewed patient's diet, addressing concerns and/or questions.   He is at risk for lack of exercise and has been provided with information to increase physical activity for the benefit of his well-being.   Patient is at risk for social isolation and has been provided with information about the benefit of social connection.   The patient was instructed to see the dentist every 6 months.   He is at risk for psychosocial distress and has been provided with information to reduce risk.       Risks, benefits and alternatives were discussed with patient. Agreeable to the plan of care.      Dav Ott is a 38 year  old, presenting for the following:  Establish Care and Physical (Fasting labs, no concerns)        5/8/2024     7:08 AM   Additional Questions   Roomed by Christal SILVA CMA        Health Care Directive  Patient does not have a Health Care Directive or Living Will: Discussed advance care planning with patient; however, patient declined at this time.    HPI        5/8/2024   General Health   How would you rate your overall physical health? Good   Feel stress (tense, anxious, or unable to sleep) Only a little   (!) STRESS CONCERN      5/8/2024   Nutrition   Three or more servings of calcium each day? Yes   Diet: Regular (no restrictions)   How many servings of fruit and vegetables per day? (!) 0-1   How many sweetened beverages each day? 0-1         5/8/2024   Exercise   Days per week of moderate/strenous exercise 3 days         5/8/2024   Social Factors   Frequency of gathering with friends or relatives Never   Worry food won't last until get money to buy more No   Food not last or not have enough money for food? No   Do you have housing?  Yes   Are you worried about losing your housing? No   Lack of transportation? No   Unable to get utilities (heat,electricity)? No   (!) SOCIAL CONNECTIONS CONCERN      5/8/2024   Dental   Dentist two times every year? (!) NO         5/8/2024   TB Screening   Were you born outside of the [place]? No         Today's PHQ-2 Score:       5/8/2024     7:07 AM   PHQ-2 ( 1999 [employer])   Q1: Little interest or pleasure in doing things 0   Q2: Feeling down, depressed or hopeless 0   PHQ-2 Score 0   Q1: Little interest or pleasure in doing things Not at all   Q2: Feeling down, depressed or hopeless Not at all   PHQ-2 Score 0           5/8/2024   Substance Use   Alcohol more than 3/day or more than 7/wk No   Do you use any other substances recreationally? No     Social History     Tobacco Use    Smoking status: Never    Smokeless tobacco: Never   Vaping Use    Vaping status: Never Used   Substance Use  Topics    Alcohol use: Yes    Drug use: No           5/8/2024   STI Screening   New sexual partner(s) since last STI/HIV test? No         5/8/2024   Contraception/Family Planning   Questions about contraception or family planning No        Reviewed and updated as needed this visit by Provider                    Patient Active Problem List   Diagnosis    Migraine headache    Other malaise and fatigue    Pectus excavatum    Tietze's disease     History reviewed. No pertinent surgical history.    Social History     Tobacco Use    Smoking status: Never    Smokeless tobacco: Never   Substance Use Topics    Alcohol use: Yes     Family History   Problem Relation Age of Onset    Sleep Apnea Father     Colon Cancer Father 62    Throat cancer Father          Current Outpatient Medications   Medication Sig Dispense Refill    azelastine (ASTELIN) 0.1 % nasal spray 2 sprays each nostril 1-2x daily as needed for nasal congestion (use nightly for first 2 week) 30 mL 11    ipratropium (ATROVENT) 0.03 % nasal spray Spray 2 sprays into both nostrils 3 times daily as needed for rhinitis (post nasal drip) 30 mL 3    magnesium oxide (MAG-OX) 400 MG tablet Take 1 tablet (400 mg) by mouth daily for 360 days 90 tablet 3    metroNIDAZOLE (METROCREAM) 0.75 % external cream APPLY A THIN LAYER TO THE AFFECTED AREAS ON FACE 1-2X DAILY, ONGOING.      predniSONE (DELTASONE) 10 MG tablet Take 1 tablet (10 mg) by mouth 2 times daily for 10 days 20 tablet 0    sodium chloride-sodium bicarb (SINUS WASH NETI POT) 2300-700 MG KIT Spray 1 packet in nostril daily for 90 days Change bottle every 3 months 90 packet 0    sulfamethoxazole-trimethoprim (BACTRIM DS) 800-160 MG tablet Take 1 tablet by mouth 2 times daily for 24 days 48 tablet 0     No Known Allergies      Review of Systems  CONSTITUTIONAL: NEGATIVE for fever, chills, change in weight  INTEGUMENTARY/SKIN: NEGATIVE for worrisome rashes, moles or lesions; hx of jock itch  EYES: NEGATIVE for vision  "changes or irritation  ENT/MOUTH: mild tinnitus and chronic sinusitis  RESP: NEGATIVE for significant cough or SOB  CV: NEGATIVE for chest pain, palpitations or peripheral edema  GI: NEGATIVE for nausea, abdominal pain, heartburn, or change in bowel habits  : NEGATIVE for frequency, dysuria, or hematuria  MUSCULOSKELETAL: NEGATIVE for significant arthralgias or myalgia, continues to have pain in coccyx with sitting, improves with positional changes.  NEURO: NEGATIVE for weakness, dizziness or paresthesias  ENDOCRINE: NEGATIVE for temperature intolerance, skin/hair changes  HEME: NEGATIVE for bleeding problems  PSYCHIATRIC: NEGATIVE for changes in mood or affect     Objective    Exam  /78 (BP Location: Left arm, Patient Position: Sitting, Cuff Size: Adult Large)   Pulse 79   Temp 98.7  F (37.1  C) (Oral)   Resp 14   Ht 1.81 m (5' 11.25\")   Wt 70.4 kg (155 lb 3.2 oz)   SpO2 98%   BMI 21.49 kg/m     Estimated body mass index is 21.49 kg/m  as calculated from the following:    Height as of this encounter: 1.81 m (5' 11.25\").    Weight as of this encounter: 70.4 kg (155 lb 3.2 oz).    Physical Exam  GENERAL: alert and no distress  EYES: Eyes grossly normal to inspection, PERRL and conjunctivae and sclerae normal  HENT: ear canals and TM's normal, nose and mouth without ulcers or lesions  NECK: no adenopathy, no asymmetry, masses, or scars  RESP: lungs clear to auscultation - no rales, rhonchi or wheezes  CV: regular rate and rhythm, normal S1 S2, no S3 or S4, no murmur, click or rub, no peripheral edema  ABDOMEN: soft, nontender, no hepatosplenomegaly, no masses and bowel sounds normal  MS: no gross musculoskeletal defects noted, no edema  SKIN: no suspicious lesions or rashes  NEURO: Normal strength and tone, mentation intact and speech normal  PSYCH: mentation appears normal, affect normal/bright        Signed Electronically by: Caitlin Ruff PA-C    "

## 2024-05-08 NOTE — PATIENT INSTRUCTIONS
"Preventive Care Advice   This is general advice we often give to help people stay healthy. Your care team may have specific advice just for you. Please talk to your care team about your own preventive care needs.  Lifestyle  Exercise at least 150 minutes each week (30 minutes a day, 5 days a week).  Do muscle strengthening activities 2 days a week. These help control your weight and prevent disease.  No smoking.  Wear sunscreen to prevent skin cancer.  Have your home tested for radon every 2 to 5 years. Radon is a colorless, odorless gas that can harm your lungs. To learn more, go to www.health.[facility].mn. and search for \"Radon in Homes.\"  Keep guns unloaded and locked up in a safe place like a safe or gun vault, or, use a gun lock and hide the keys. Always lock away bullets separately. To learn more, visit Tooth[employer].[place].gov and search for \"safe gun storage.\"  Nutrition  Eat 5 or more servings of fruits and vegetables each day.  Try wheat bread, brown rice and whole grain pasta (instead of white bread, rice, and pasta).  Get enough calcium and vitamin D. Check the label on foods and aim for 100% of the RDA (recommended daily allowance).  Regular exams  Have a dental exam and cleaning every 6 months.  See your health care team every year to talk about:  Any changes in your health.  Any medicines your care team has prescribed.  Preventive care, family planning, and ways to prevent chronic diseases.  Shots (vaccines)   HPV shots (up to age 26), if you've never had them before.  Hepatitis B shots (up to age 59), if you've never had them before.  COVID-19 shot: Get this shot when it's due.  Flu shot: Get a flu shot every year.  Tetanus shot: Get a tetanus shot every 10 years.  Pneumococcal, hepatitis A, and RSV shots: Ask your care team if you need these based on your risk.  Shingles shot (for age 50 and up).  General health tests  Diabetes screening:  Starting at age 35, Get screened for diabetes at least every 3 years.  If " you are younger than age 35, ask your care team if you should be screened for diabetes.  Cholesterol test: At age 39, start having a cholesterol test every 5 years, or more often if advised.  Bone density scan (DEXA): At age 50, ask your care team if you should have this scan for osteoporosis (brittle bones).  Hepatitis C: Get tested at least once in your life.  Abdominal aortic aneurysm screening: Talk to your doctor about having this screening if you:  Have ever smoked; and  Are biologically male; and  Are between the ages of 65 and 75.  STIs (sexually transmitted infections)  Before age 24: Ask your care team if you should be screened for STIs.  After age 24: Get screened for STIs if you're at risk. You are at risk for STIs (including HIV) if:  You are sexually active with more than one person.  You don't use condoms every time.  You or a partner was diagnosed with a sexually transmitted infection.  If you are at risk for HIV, ask about PrEP medicine to prevent HIV.  Get tested for HIV at least once in your life, whether you are at risk for HIV or not.  Cancer screening tests  Cervical cancer screening: If you have a cervix, begin getting regular cervical cancer screening tests at age 21. Most people who have regular screenings with normal results can stop after age 65. Talk about this with your provider.  Breast cancer scan (mammogram): If you've ever had breasts, begin having regular mammograms starting at age 40. This is a scan to check for breast cancer.  Colon cancer screening: It is important to start screening for colon cancer at age 45.  Have a colonoscopy test every 10 years (or more often if you're at risk) Or, ask your provider about stool tests like a FIT test every year or Cologuard test every 3 years.  To learn more about your testing options, visit: www.[employer]/437761.pdf.  For help making a decision, visit: geraldo/vf60898.  Prostate cancer screening test: If you have a prostate and are age 55  to 69, ask your provider if you would benefit from a yearly prostate cancer screening test.  Lung cancer screening: If you are a current or former smoker age 50 to 80, ask your care team if ongoing lung cancer screenings are right for you.  For informational purposes only. Not to replace the advice of your health care provider. Copyright   2023 [employer]. All rights reserved. Clinically reviewed by the [facility] Transitions Program. [employer] 055714 - REV 04/24.    Relationships for Good Health  Relationships are important for our health and happiness. Social isolation, loneliness and lack of support are bad for your health. Studies show that loneliness can harm health and limit your life span as much as high blood pressure and smoking.   Take some time to reflect on your relationships. Then answer these questions:  Are there people in your life that cause you stress or drain your energy? What can you do to set limits?  ________________________________________________________________________________________________________________________________________________________________________________________________________________________________________________________________________________________________________________________________________________  Who do you enjoy spending time with? Who can you go to for support?  ________________________________________________________________________________________________________________________________________________________________________________________________________________________________________________________________________________________________________________________________________________  What can you do to improve your relationships with  others?  __________________________________________________________________________________________________________________________________________________________________________________________________________________  ______________________________________________________________________________________________________________________________  What do you like most about your relationships with others?  ________________________________________________________________________________________________________________________________________________________________________________________________________________________________________________________________________________________________________________________________________________  My goal: ______________________________________________________________________  I will ______________________________________________________________________________________________________________________________________________________________________________________________    For informational purposes only. Not to replace the advice of your health care provider. Copyright   2018 [facility]. All rights reserved. Clinically reviewed by B[facility]ric Health  Team. [employer] 871536 - Rev 04/21.    Learning About Stress  What is stress?     Stress is your body's response to a hard situation. Your body can have a physical, emotional, or mental response. Stress is a fact of life for most people, and it affects everyone differently. What causes stress for you may not be stressful for someone else.  A lot of things can cause stress. You may feel stress when you go on a job interview, take a test, or run a race. This kind of short-term stress is normal and even useful. It can help you if you need to work hard or react quickly. For example, stress can help you finish an important job on time.  Long-term stress is caused by ongoing stressful situations or events. Examples  of long-term stress include long-term health problems, ongoing problems at work, or conflicts in your family. Long-term stress can harm your health.  How does stress affect your health?  When you are stressed, your body responds as though you are in danger. It makes hormones that speed up your heart, make you breathe faster, and give you a burst of energy. This is called the fight-or-flight stress response. If the stress is over quickly, your body goes back to normal and no harm is done.  But if stress happens too often or lasts too long, it can have bad effects. Long-term stress can make you more likely to get sick, and it can make symptoms of some diseases worse. If you tense up when you are stressed, you may develop neck, shoulder, or low back pain. Stress is linked to high blood pressure and heart disease.  Stress also harms your emotional health. It can make you guzman, tense, or depressed. Your relationships may suffer, and you may not do well at work or school.  What can you do to manage stress?  You can try these things to help manage stress:   Do something active. Exercise or activity can help reduce stress. Walking is a great way to get started. Even everyday activities such as housecleaning or yard work can help.  Try yoga or sharath chi. These techniques combine exercise and meditation. You may need some training at first to learn them.  Do something you enjoy. For example, listen to music or go to a movie. Practice your hobby or do volunteer work.  Meditate. This can help you relax, because you are not worrying about what happened before or what may happen in the future.  Do guided imagery. Imagine yourself in any setting that helps you feel calm. You can use online videos, books, or a teacher to guide you.  Do breathing exercises. For example:  From a standing position, bend forward from the waist with your knees slightly bent. Let your arms dangle close to the floor.  Breathe in slowly and deeply as you  "return to a standing position. Roll up slowly and lift your head last.  Hold your breath for just a few seconds in the standing position.  Breathe out slowly and bend forward from the waist.  Let your feelings out. Talk, laugh, cry, and express anger when you need to. Talking with supportive friends or family, a counselor, or a joe leader about your feelings is a healthy way to relieve stress. Avoid discussing your feelings with people who make you feel worse.  Write. It may help to write about things that are bothering you. This helps you find out how much stress you feel and what is causing it. When you know this, you can find better ways to cope.  What can you do to prevent stress?  You might try some of these things to help prevent stress:  Manage your time. This helps you find time to do the things you want and need to do.  Get enough sleep. Your body recovers from the stresses of the day while you are sleeping.  Get support. Your family, friends, and community can make a difference in how you experience stress.  Limit your news feed. Avoid or limit time on social media or news that may make you feel stressed.  Do something active. Exercise or activity can help reduce stress. Walking is a great way to get started.  Where can you learn more?  Go to https://www.Cloud[employer].net/patiented  Enter N032 in the search box to learn more about \"Learning About Stress.\"  Current as of: October 24, 2023               Content Version: 14.0    3166-2012 [employer].   Care instructions adapted under license by your healthcare professional. If you have questions about a medical condition or this instruction, always ask your healthcare professional. [employer] disclaims any warranty or liability for your use of this information.      "

## 2024-05-10 ENCOUNTER — MYC MEDICAL ADVICE (OUTPATIENT)
Dept: FAMILY MEDICINE | Facility: CLINIC | Age: 39
End: 2024-05-10
Payer: COMMERCIAL

## 2024-05-10 DIAGNOSIS — Z00.00 ROUTINE GENERAL MEDICAL EXAMINATION AT A HEALTH CARE FACILITY: Primary | ICD-10-CM

## 2024-05-10 LAB — HBA1C MFR BLD: 5.3 % (ref 0–5.6)

## 2024-05-19 ENCOUNTER — MYC MEDICAL ADVICE (OUTPATIENT)
Dept: FAMILY MEDICINE | Facility: CLINIC | Age: 39
End: 2024-05-19
Payer: COMMERCIAL

## 2024-06-18 ENCOUNTER — OFFICE VISIT (OUTPATIENT)
Dept: ALLERGY | Facility: CLINIC | Age: 39
End: 2024-06-18
Payer: COMMERCIAL

## 2024-06-18 ENCOUNTER — TRANSFERRED RECORDS (OUTPATIENT)
Dept: HEALTH INFORMATION MANAGEMENT | Facility: CLINIC | Age: 39
End: 2024-06-18

## 2024-06-18 VITALS — OXYGEN SATURATION: 96 % | SYSTOLIC BLOOD PRESSURE: 124 MMHG | DIASTOLIC BLOOD PRESSURE: 71 MMHG | HEART RATE: 82 BPM

## 2024-06-18 DIAGNOSIS — J32.4 CHRONIC PANSINUSITIS: Primary | ICD-10-CM

## 2024-06-18 DIAGNOSIS — R09.81 NASAL CONGESTION: ICD-10-CM

## 2024-06-18 PROCEDURE — 99203 OFFICE O/P NEW LOW 30 MIN: CPT | Mod: 25 | Performed by: ALLERGY & IMMUNOLOGY

## 2024-06-18 PROCEDURE — 95004 PERQ TESTS W/ALRGNC XTRCS: CPT | Performed by: ALLERGY & IMMUNOLOGY

## 2024-06-18 RX ORDER — IPRATROPIUM BROMIDE 21 UG/1
SPRAY, METERED NASAL
COMMUNITY
Start: 2024-06-11

## 2024-06-18 NOTE — PROGRESS NOTES
See Blakely was seen in the Allergy Clinic at [facility].    See Blakely is a 38 year old White male being seen today at the request of Dr. Briggs in consultation for possible allergies.    History of Present Illness    - Tru Blakely, male, 38 years old  - Breathing and sinus issues for several years  - Snoring loudly, sleep test conducted a few years ago did not indicate sleep apnea  - Constant congestion for the past year or longer, always one nostril plugged  - Post-nasal drip and sinus pressure, especially at work  - Symptoms have improved with nasal sprays prescribed by ENT - azelastine and ipratropium  - No sneezing or eye symptoms  - No childhood history of allergy symptoms  - No history of asthma           Sinus CT 4/29/24:  IMPRESSION:  1.  Mild nonobstructive mucosal thickening within the paranasal sinuses. No air-fluid levels to suggest acute sinusitis.    History reviewed. No pertinent past medical history.  Family History   Problem Relation Age of Onset    Sleep Apnea Father     Colon Cancer Father 62    Throat cancer Father      History reviewed. No pertinent surgical history.    ENVIRONMENTAL HISTORY:   [place] The home is heated with a forced air. They do have central air conditioning. The patient's bedroom is furnished with feather/wool bedding or pillows, carpeting in bedroom, and fabric window coverings.  Pets inside the house include 1 cat(s). There is no history of cockroach or mice infestation. Do you smoke cigarettes or other recreational drugs? No Do you vape or use an e-cigarette? No. There is/are 0 smokers living in the house. There is/are 0 who smoke ecigarettes/vape living in the house. The house does not have a damp basement.     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        Current Outpatient Medications:     azelastine (ASTELIN)  0.1 % nasal spray, 2 sprays each nostril 1-2x daily as needed for nasal congestion (use nightly for first 2 week), Disp: 30 mL, Rfl: 11    ipratropium (ATROVENT) 0.03 % nasal spray, Spray 2 sprays into both nostrils 3 times daily as needed for rhinitis (post nasal drip)*, Disp: , Rfl:     magnesium oxide (MAG-OX) 400 MG tablet, Take 1 tablet (400 mg) by mouth daily for 360 days, Disp: 90 tablet, Rfl: 3    sodium chloride-sodium bicarb (SINUS WASH NETI POT) 2300-700 MG KIT, Spray 1 packet in nostril daily for 90 days Change bottle every 3 months, Disp: 90 packet, Rfl: 0    metroNIDAZOLE (METROCREAM) 0.75 % external cream, APPLY A THIN LAYER TO THE AFFECTED AREAS ON FACE 1-2X DAILY, ONGOING., Disp: , Rfl:   Immunization History   Administered Date(s) Administered    COVID-19 12+ (2023-24) (Pfizer) 12/12/2023    COVID-19 Bivalent 12+ (Pfizer) 11/09/2022    COVID-19 Monovalent 18+ (Moderna) 04/22/2021, 05/20/2021, 12/09/2021    DTAP (<7y) 1984, 1984, 02/18/1985, 05/21/1986, 09/14/1989    Flu, Unspecified 10/26/2019    Hepatitis B, Peds 01/21/1993, 07/07/2000, 12/22/2000    Influenza Vaccine >6 months,quad, PF 10/22/2019, 12/09/2021, 11/13/2022, 09/20/2023    Influenza Vaccine, 6+MO IM (QUADRIVALENT W/PRESERVATIVES) 09/26/2018, 09/29/2020    MMR 10/30/1996    OPV, trivalent, live 1984, 1984, 05/21/1986, 09/13/1989    TDAP (Adacel,Boostrix) 01/26/2015    Td (Adult), Adsorbed 10/30/1996     No Known Allergies      EXAM:   /71 (BP Location: Left arm, Patient Position: Sitting, Cuff Size: Adult Regular)   Pulse 82   SpO2 96%   Physical Exam  Vitals and nursing note reviewed.   Constitutional:       Appearance: Normal appearance.   HENT:      Head: Normocephalic and atraumatic.      Right Ear: External ear normal.      Left Ear: External ear normal.      Nose: No mucosal edema, congestion or rhinorrhea.      Mouth/Throat:      Mouth: Mucous membranes are moist. No oral lesions.      Pharynx:  Oropharynx is clear. Uvula midline. No posterior oropharyngeal erythema.   Eyes:      General: Lids are normal.      Extraocular Movements: Extraocular movements intact.      Conjunctiva/sclera: Conjunctivae normal.   Neck:      Comments: No asymmetry, masses, or scars  Cardiovascular:      Rate and Rhythm: Normal rate and regular rhythm.      Heart sounds: S1 normal and S2 normal. No murmur heard.  Pulmonary:      Effort: Pulmonary effort is normal. No respiratory distress.      Breath sounds: Normal breath sounds and air entry.   Musculoskeletal:      Comments: No musculoskeletal defects noted   Skin:     General: Skin is warm and dry.      Findings: No lesion or rash.   Neurological:      General: No focal deficit present.      Mental Status: He is alert.   Psychiatric:         Mood and Affect: Mood and affect normal.           WORKUP: Skin testing  ENVIRONMENTAL PERCUTANEOUS SKIN TESTING: ADULT      6/18/2024     3:00 PM   [facility] [employer]   Consent Y   Ordering Physician Soto   Interpreting Physician Soto   Testing Technician Cherise   Location Back   Time start: 15:29   Time End: 15:44   Positive Control: Histatrol*ALK 1 mg/ml 7/30   Negative Control: 50% Glycerin 0   Cat Hair*ALK (10,000 BAU/ml) 0   AP Dog Hair/Dander (1:100 w/v) 0   Dust Mite p. 30,000 AU/ml 0   Dust Mite f. (30,000 AU/ml) 0   [place] (W/F in millimeters) 0   Ton Grass (100,000 BAU/mL) 0   Red Cedar (W/F in millimeters) 0   Maple/[place] (W/F in millimeters) 0   Hackberry (W/F in millimeters) 0   [place] (W/F in millimeters) 0   [place] *ALK (W/F in millimeters) 0   American Elm (W/F in millimeters) 0   [place] (W/F in millimeters) 0   Black [place] (W/F in millimeters) 0   Birch Mix (W/F in millimeters) 0   [place] (W/F in millimeters) 0   Oak (W/F in millimeters) 0   Cocklebur (W/F in millimeters) 0   [place] (W/F in millimeters) 0   White Tru (W/F in millimeters) 0   Careless (W/F in millimeters) 0   Nettle (W/F in  millimeters) 0   English Plantain (W/F in millimeters) 0   Kochia (W/F in millimeters) 0   Lamb's Quarter (W/F in millimeters) 0   Marshelder (W/F in millimeters) 0   Ragweed Mix* ALK (W/F in millimeters) 0   Russian Thistle (W/F in millimeters) 0   Sagebrush/Mugwort (W/F in millimeters) 0   Sheep Sorrel (W/F in millimeters) 0   Feather Mix* ALK (W/F in millimeters) 0   Penicillium Mix (1:10 w/v) 0   Curvularia spicifera (1:10 w/v) 0   Epicoccum (1:10 w/v) 0   Aspergillus fumigatus (1:10 w/v): 0   Alternaria tenius (1:10 w/v) 0   H. Cladosporium (1:10 w/v) 0   Phoma herbarum (1:10 w/v) 0      Appropriate response to controls, all others negative    ASSESSMENT/PLAN:  See Blakely is a 38 year old male here for evaluation of possible allergies.    1. Chronic pansinusitis - Symptoms have improved since he began using azelastine and ipratropium nasal sprays. Allergy testing today is negative for evidence of aeroallergen sensitization. Advised that allergies are not contributing to his sinus symptoms. Recommend continuing with intranasal medications as prescribed and following up with Dr. Briggs as recommended.    - Adult Allergy/Asthma  Referral  - ALLERGY SKIN TESTS,ALLERGENS    2. Nasal congestion    - Adult Allergy/Asthma  Referral  - ALLERGY SKIN TESTS,ALLERGENS      Follow-up as needed      Thank you for allowing me to participate in the care of See Blakely.      Meng Valera MD, FAAAAI  Allergy/Immunology  [facility]    Consent was obtained from the patient to use an AI documentation tool in the creation of this note.    Chart documentation done in part with Dragon Voice Recognition Software. Although reviewed after completion, some word and grammatical errors may remain.

## 2024-06-18 NOTE — LETTER
6/18/2024      See Blakely  [address] [id number]      Dear Colleague,    Thank you for referring your patient, See Blakely, to the [facility]. Please see a copy of my visit note below.    See Blakely was seen in the Allergy Clinic at [facility].    See Blakely is a 38 year old White male being seen today at the request of Dr. Briggs in consultation for possible allergies.    History of Present Illness    - Tru Blakely, male, 38 years old  - Breathing and sinus issues for several years  - Snoring loudly, sleep test conducted a few years ago did not indicate sleep apnea  - Constant congestion for the past year or longer, always one nostril plugged  - Post-nasal drip and sinus pressure, especially at work  - Symptoms have improved with nasal sprays prescribed by ENT - azelastine and ipratropium  - No sneezing or eye symptoms  - No childhood history of allergy symptoms  - No history of asthma           Sinus CT 4/29/24:  IMPRESSION:  1.  Mild nonobstructive mucosal thickening within the paranasal sinuses. No air-fluid levels to suggest acute sinusitis.    History reviewed. No pertinent past medical history.  Family History   Problem Relation Age of Onset     Sleep Apnea Father      Colon Cancer Father 62     Throat cancer Father      History reviewed. No pertinent surgical history.    ENVIRONMENTAL HISTORY:   [place] The home is heated with a forced air. They do have central air conditioning. The patient's bedroom is furnished with feather/wool bedding or pillows, carpeting in bedroom, and fabric window coverings.  Pets inside the house include 1 cat(s). There is no history of cockroach or mice infestation. Do you smoke cigarettes or other recreational drugs? No Do you vape or use an e-cigarette? No. There is/are 0 smokers living in the house. There is/are 0 who smoke ecigarettes/vape  living in the house. The house does not have a damp basement.     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        Current Outpatient Medications:      azelastine (ASTELIN) 0.1 % nasal spray, 2 sprays each nostril 1-2x daily as needed for nasal congestion (use nightly for first 2 week), Disp: 30 mL, Rfl: 11     ipratropium (ATROVENT) 0.03 % nasal spray, Spray 2 sprays into both nostrils 3 times daily as needed for rhinitis (post nasal drip)*, Disp: , Rfl:      magnesium oxide (MAG-OX) 400 MG tablet, Take 1 tablet (400 mg) by mouth daily for 360 days, Disp: 90 tablet, Rfl: 3     sodium chloride-sodium bicarb (SINUS WASH NETI POT) 2300-700 MG KIT, Spray 1 packet in nostril daily for 90 days Change bottle every 3 months, Disp: 90 packet, Rfl: 0     metroNIDAZOLE (METROCREAM) 0.75 % external cream, APPLY A THIN LAYER TO THE AFFECTED AREAS ON FACE 1-2X DAILY, ONGOING., Disp: , Rfl:   Immunization History   Administered Date(s) Administered     COVID-19 12+ (2023-24) (Pfizer) 12/12/2023     COVID-19 Bivalent 12+ (Pfizer) 11/09/2022     COVID-19 Monovalent 18+ (Moderna) 04/22/2021, 05/20/2021, 12/09/2021     DTAP (<7y) 1984, 1984, 02/18/1985, 05/21/1986, 09/14/1989     Flu, Unspecified 10/26/2019     Hepatitis B, Peds 01/21/1993, 07/07/2000, 12/22/2000     Influenza Vaccine >6 months,quad, PF 10/22/2019, 12/09/2021, 11/13/2022, 09/20/2023     Influenza Vaccine, 6+MO IM (QUADRIVALENT W/PRESERVATIVES) 09/26/2018, 09/29/2020     MMR 10/30/1996     OPV, trivalent, live 1984, 1984, 05/21/1986, 09/13/1989     TDAP (Adacel,Boostrix) 01/26/2015     Td (Adult), Adsorbed 10/30/1996     No Known Allergies      EXAM:   /71 (BP Location: Left arm, Patient Position: Sitting, Cuff Size: Adult Regular)   Pulse 82   SpO2 96%   Physical Exam  Vitals and nursing note reviewed.   Constitutional:       Appearance: Normal appearance.    HENT:      Head: Normocephalic and atraumatic.      Right Ear: External ear normal.      Left Ear: External ear normal.      Nose: No mucosal edema, congestion or rhinorrhea.      Mouth/Throat:      Mouth: Mucous membranes are moist. No oral lesions.      Pharynx: Oropharynx is clear. Uvula midline. No posterior oropharyngeal erythema.   Eyes:      General: Lids are normal.      Extraocular Movements: Extraocular movements intact.      Conjunctiva/sclera: Conjunctivae normal.   Neck:      Comments: No asymmetry, masses, or scars  Cardiovascular:      Rate and Rhythm: Normal rate and regular rhythm.      Heart sounds: S1 normal and S2 normal. No murmur heard.  Pulmonary:      Effort: Pulmonary effort is normal. No respiratory distress.      Breath sounds: Normal breath sounds and air entry.   Musculoskeletal:      Comments: No musculoskeletal defects noted   Skin:     General: Skin is warm and dry.      Findings: No lesion or rash.   Neurological:      General: No focal deficit present.      Mental Status: He is alert.   Psychiatric:         Mood and Affect: Mood and affect normal.           WORKUP: Skin testing  ENVIRONMENTAL PERCUTANEOUS SKIN TESTING: ADULT      6/18/2024     3:00 PM   [place] [employer]   Consent Y   Ordering Physician Soto   Interpreting Physician Soto   Testing Technician Cherise   Location Back   Time start: 15:29   Time End: 15:44   Positive Control: Histatrol*ALK 1 mg/ml 7/30   Negative Control: 50% Glycerin 0   Cat Hair*ALK (10,000 BAU/ml) 0   AP Dog Hair/Dander (1:100 w/v) 0   Dust Mite p. 30,000 AU/ml 0   Dust Mite f. (30,000 AU/ml) 0   [place] (W/F in millimeters) 0   Ton Grass (100,000 BAU/mL) 0   Red Cedar (W/F in millimeters) 0   Maple/[place] (W/F in millimeters) 0   Hackberry (W/F in millimeters) 0   [place] (W/F in millimeters) 0   [place] *ALK (W/F in millimeters) 0   American Elm (W/F in millimeters) 0   [place] (W/F in millimeters) 0   Black [place] (W/F in  millimeters) 0   Birch Mix (W/F in millimeters) 0   [place] (W/F in millimeters) 0   Oak (W/F in millimeters) 0   Cocklebur (W/F in millimeters) 0   [place] (W/F in millimeters) 0   White Tru (W/F in millimeters) 0   Careless (W/F in millimeters) 0   Nettle (W/F in millimeters) 0   English Plantain (W/F in millimeters) 0   Kochia (W/F in millimeters) 0   Lamb's Quarter (W/F in millimeters) 0   Marshelder (W/F in millimeters) 0   Ragweed Mix* ALK (W/F in millimeters) 0   Russian Thistle (W/F in millimeters) 0   Sagebrush/Mugwort (W/F in millimeters) 0   Sheep Sorrel (W/F in millimeters) 0   Feather Mix* ALK (W/F in millimeters) 0   Penicillium Mix (1:10 w/v) 0   Curvularia spicifera (1:10 w/v) 0   Epicoccum (1:10 w/v) 0   Aspergillus fumigatus (1:10 w/v): 0   Alternaria tenius (1:10 w/v) 0   H. Cladosporium (1:10 w/v) 0   Phoma herbarum (1:10 w/v) 0      Appropriate response to controls, all others negative    ASSESSMENT/PLAN:  See Blakely is a 38 year old male here for evaluation of possible allergies.    1. Chronic pansinusitis - Symptoms have improved since he began using azelastine and ipratropium nasal sprays. Allergy testing today is negative for evidence of aeroallergen sensitization. Advised that allergies are not contributing to his sinus symptoms. Recommend continuing with intranasal medications as prescribed and following up with Dr. Briggs as recommended.    - Adult Allergy/Asthma  Referral  - ALLERGY SKIN TESTS,ALLERGENS    2. Nasal congestion    - Adult Allergy/Asthma  Referral  - ALLERGY SKIN TESTS,ALLERGENS      Follow-up as needed      Thank you for allowing me to participate in the care of See Blakely.      Meng Valera MD, FAAAAI  Allergy/Immunology  [facility]    Consent was obtained from the patient to use an AI documentation tool in the creation of this note.    Chart documentation done in  part with Dragon Voice Recognition Software. Although reviewed after completion, some word and grammatical errors may remain.      Again, thank you for allowing me to participate in the care of your patient.        Sincerely,        Meng Valera MD

## 2024-06-24 ENCOUNTER — E-VISIT (OUTPATIENT)
Dept: FAMILY MEDICINE | Facility: CLINIC | Age: 39
End: 2024-06-24
Payer: COMMERCIAL

## 2024-06-24 DIAGNOSIS — Z82.79 FAMILY HISTORY OF BICUSPID AORTIC VALVE: Primary | ICD-10-CM

## 2024-06-24 PROCEDURE — 99421 OL DIG E/M SVC 5-10 MIN: CPT | Performed by: PHYSICIAN ASSISTANT

## 2024-07-23 ENCOUNTER — HOSPITAL ENCOUNTER (OUTPATIENT)
Dept: CARDIOLOGY | Facility: HOSPITAL | Age: 39
Discharge: HOME | End: 2024-07-23
Attending: PHYSICIAN ASSISTANT | Admitting: PHYSICIAN ASSISTANT
Payer: COMMERCIAL

## 2024-07-23 DIAGNOSIS — Z82.79 FAMILY HISTORY OF BICUSPID AORTIC VALVE: ICD-10-CM

## 2024-07-23 LAB — LVEF ECHO: NORMAL

## 2024-07-23 PROCEDURE — 999N000208 ECHOCARDIOGRAM COMPLETE

## 2024-07-23 PROCEDURE — C8929 TTE W OR WO FOL WCON,DOPPLER: HCPCS

## 2024-07-23 PROCEDURE — 255N000002 HC RX 255 OP 636: Performed by: PHYSICIAN ASSISTANT

## 2024-07-23 PROCEDURE — 93306 TTE W/DOPPLER COMPLETE: CPT | Mod: 26 | Performed by: INTERNAL MEDICINE

## 2024-07-23 RX ADMIN — PERFLUTREN 2 ML: 6.52 INJECTION, SUSPENSION INTRAVENOUS at 13:34

## 2024-08-07 ENCOUNTER — OFFICE VISIT (OUTPATIENT)
Dept: FAMILY MEDICINE | Facility: CLINIC | Age: 39
End: 2024-08-07
Payer: COMMERCIAL

## 2024-08-07 VITALS
RESPIRATION RATE: 19 BRPM | SYSTOLIC BLOOD PRESSURE: 108 MMHG | BODY MASS INDEX: 21.74 KG/M2 | DIASTOLIC BLOOD PRESSURE: 74 MMHG | WEIGHT: 157 LBS | HEART RATE: 78 BPM | OXYGEN SATURATION: 98 % | TEMPERATURE: 98.6 F

## 2024-08-07 DIAGNOSIS — M54.42 ACUTE LEFT-SIDED LOW BACK PAIN WITH LEFT-SIDED SCIATICA: Primary | ICD-10-CM

## 2024-08-07 PROCEDURE — 99213 OFFICE O/P EST LOW 20 MIN: CPT | Performed by: PHYSICIAN ASSISTANT

## 2024-08-07 NOTE — PATIENT INSTRUCTIONS
Your back pain from left sciatica.     I have prescribed you a short course of antiinflammatory to help decrease the inflammation in the area of the spasm. This should help to decrease associated numbness/tingling (nerve pain). Please take as directed.    Do not take OTC ibuprofen or NSAIDs while on the Mobic. If having pain, take Tylenol up to 1,000 mg, 4 times daily.    Try using a heating pad and/or warm baths.    Make sure to keep moving to avoid getting stiff. See below for stretching exercises.    If you develop fever, severe pain that prevents you from walking at all, weakness of your arms or legs, loss of bowel or bladder continence, or any other new concerning symptoms, go to the ER immediately.    Otherwise, follow up with primary care doctor as needed or if no improvement in pain in symptoms in 1 week.

## 2024-08-07 NOTE — PROGRESS NOTES
Patient presents with:  Foot Problems: Saturday, LEFT foot,no pain, numbness, still able to walk away.       Clinical Decision Making:  Patient was diagnosed with sciatica on the left lower extremity with the distribution of L5-S1.  Patient did not have any precipitating event or injury no prior surgeries.  However, the patient has prolonged sitting at work.  He will use simple stretching change in position each hour while awake and use of the Mobic.  No lumbar x-ray was obtained in the office today.  Discussion regarding the pros and cons of x-ray reviewed with the patient in the office.  Recommend follow-up with primary care provider for advanced imaging or referral to PT and chiropractic if not getting good resolution with symptomatic care over the next 2 weeks patient voiced understanding of his concerns and questions were answered to his satisfaction before discharge.       ICD-10-CM    1. Acute left-sided low back pain with left-sided sciatica  M54.42           Patient Instructions   Your back pain from left sciatica.     I have prescribed you a short course of antiinflammatory to help decrease the inflammation in the area of the spasm. This should help to decrease associated numbness/tingling (nerve pain). Please take as directed.    Do not take OTC ibuprofen or NSAIDs while on the Mobic. If having pain, take Tylenol up to 1,000 mg, 4 times daily.    Try using a heating pad and/or warm baths.    Make sure to keep moving to avoid getting stiff. See below for stretching exercises.    If you develop fever, severe pain that prevents you from walking at all, weakness of your arms or legs, loss of bowel or bladder continence, or any other new concerning symptoms, go to the ER immediately.    Otherwise, follow up with primary care doctor as needed or if no improvement in pain in symptoms in 1 week.            HPI:  See Blakely is a 38 year old male who presents today for evaluation of left foot paresthesia that  is on the great toe or dorsum of the left foot over the first metatarsal ray up to the ankle.  Does not going to the plantar surface of the foot or the back of the calf or up into the knee thigh or lower back patient has not had fever chills night sweats fecal or urinary incontinence.  No current pain.  No position with standing sitting or lying makes it better or worse.  He has not tried treatment for this at home.    History obtained from chart review and the patient.    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    Social History     Tobacco Use    Smoking status: Never    Smokeless tobacco: Never   Substance Use Topics    Alcohol use: Yes       Review of Systems  As above in HPI otherwise negative.    Vitals:    08/07/24 1043   BP: 108/74   BP Location: Right arm   Patient Position: Sitting   Cuff Size: Adult Regular   Pulse: 78   Resp: 19   Temp: 98.6  F (37  C)   TempSrc: Oral   SpO2: 98%   Weight: 71.2 kg (157 lb)       General: Patient is resting comfortably no acute distress is afebrile  HEENT: Head is normocephalic atraumatic   eyes are PERRL EOMI sclera anicteric   Skin: Without rash non-diaphoretic  Musculoskeletal:  Patient has full range of motion at the back with flexion extension and rotation left and right  Rising onto balls of feet and heels are with good strength and do not exacerbate his symptoms  Negative flip sign with straight leg raises while seated  Negative Stinchfield testing.    Physical Exam    At the end of the encounter, I discussed results, diagnosis, medications. Discussed red flags for immediate return to clinic/ER, as well as indications for follow up if no improvement. Patient understood and agreed to plan. Patient was stable for discharge.

## 2024-08-08 DIAGNOSIS — M54.42 ACUTE LEFT-SIDED LOW BACK PAIN WITH LEFT-SIDED SCIATICA: Primary | ICD-10-CM

## 2024-08-08 NOTE — TELEPHONE ENCOUNTER
Patient calls stating that he is at the pharmacy trying to  his anti-inflammatory and they have no record of it.     Per chart review patient had visit 8/7/24 and plan was for Rx of Mobic.     RN advised patient that for tonight he may use OTC Tylenol or Ibuprofen/Aleve until he can get his RX filled. Patient verbalizes understanding and has no further questions or concerns.

## 2024-08-09 RX ORDER — MELOXICAM 7.5 MG/1
7.5 TABLET ORAL DAILY
Qty: 30 TABLET | Refills: 0 | Status: SHIPPED | OUTPATIENT
Start: 2024-08-09 | End: 2024-09-08

## 2024-08-09 NOTE — TELEPHONE ENCOUNTER
Please call this medication in for patient. Was written out but not officially sent to pharmacy (Mobic). Patient requested this yesterday too and it was never sent. Heading to the cabin in [place] today, attached new pharmacy to send this to ASAP.

## 2024-08-09 NOTE — TELEPHONE ENCOUNTER
Call and spoke with patient and relayed message. No questions.    Jennifer Garrison on 8/9/2024 at 11:42 AM

## 2024-10-07 ENCOUNTER — OFFICE VISIT (OUTPATIENT)
Dept: OTOLARYNGOLOGY | Facility: CLINIC | Age: 39
End: 2024-10-07
Payer: COMMERCIAL

## 2024-10-07 DIAGNOSIS — J32.1 SINUSITIS CHRONIC, FRONTAL: ICD-10-CM

## 2024-10-07 DIAGNOSIS — R09.82 PND (POST-NASAL DRIP): ICD-10-CM

## 2024-10-07 DIAGNOSIS — J34.2 DEVIATED NASAL SEPTUM: Primary | ICD-10-CM

## 2024-10-07 DIAGNOSIS — J32.0 CHRONIC MAXILLARY SINUSITIS: ICD-10-CM

## 2024-10-07 DIAGNOSIS — J34.3 HYPERTROPHY OF BOTH INFERIOR NASAL TURBINATES: ICD-10-CM

## 2024-10-07 PROCEDURE — 99214 OFFICE O/P EST MOD 30 MIN: CPT | Performed by: OTOLARYNGOLOGY

## 2024-10-07 RX ORDER — SODIUM SULFACETAMIDE AND SULFUR 80; 40 MG/ML; MG/ML
SOLUTION TOPICAL
COMMUNITY
Start: 2023-12-08

## 2024-10-07 RX ORDER — SOD CHLOR,BICARB/SQUEEZ BOTTLE
PACKET, WITH RINSE DEVICE NASAL
COMMUNITY
Start: 2024-05-06

## 2024-10-07 RX ORDER — KETOCONAZOLE 20 MG/ML
SHAMPOO, SUSPENSION TOPICAL
COMMUNITY
Start: 2024-06-12

## 2024-10-07 RX ORDER — KETOCONAZOLE 20 MG/G
CREAM TOPICAL
COMMUNITY
Start: 2024-06-12

## 2024-10-07 NOTE — NURSING NOTE
Sino-Nasal Outcome Test (SNOT - 22)    1. Need to Blow Nose: (P) Very mild  2. Nasal Blockage: (P) Mild or slight  3. Sneezing: (P) None  4. Runny Nose: (P) None  5. Cough: (P) None  6. Post-nasal discharge: (P) Moderate  7. Thick nasal discharge: (P) Very mild  8. Ear fullness: (P) Very mild  9. Dizziness: (P) Very mild  10. Ear Pain: (P) None  11. Facial pain/pressure: (P) Moderate  12. Decreased Sense of Smell/Taste: (P) None  13. Difficulty falling asleep: (P) None  14. Wake up at night: (P) None  15. Lack of a good night's sleep: (P) Moderate  16. Wake up tired: (P) Mild or slight  17. Fatigue: (P) Very mild  18. Reduced Productivity: (P) None  19. Reduced Concentration: (P) Very mild  20. Frustrated/restless/irritable: (P) None  21. Sad: (P) None  22. Embarrassed: (P) None    Total Score: (P) 19    COPYRIGHT 1996. [facility]

## 2024-10-07 NOTE — LETTER
10/7/2024      See Blakely  [address] [facility] [address]      Dear Colleague,    Thank you for referring your patient, See Blakely, to the [facility]. Please see a copy of my visit note below.    FOLLOW UP VISIT NOTE    Patient presents with:  RECHECK: 6 Month Follow up, Nasal Congestion, PND. Discuss surgery. Last SNOT = 20. Sx have been better since last visit. Today's SNOT = 19. Saw allergy 6/18 and negative testing.        HISTORY OF PRESENT ILLNESS    Tru was seen in follow up after previous 5/6/2024 visit for recheck.    My previous visit:      Orders Placed This Encounter   Medications     sodium chloride-sodium bicarb (SINUS WASH NETI POT) 2300-700 MG KIT       Sig: Spray 1 packet in nostril daily for 90 days Change bottle every 3 months       Dispense:  90 packet       Refill:  0     sulfamethoxazole-trimethoprim (BACTRIM DS) 800-160 MG tablet       Sig: Take 1 tablet by mouth 2 times daily for 24 days       Dispense:  48 tablet       Refill:  0     predniSONE (DELTASONE) 10 MG tablet       Sig: Take 1 tablet (10 mg) by mouth 2 times daily for 10 days       Dispense:  20 tablet       Refill:  0      Return visit in mid fall to discuss BA septoplasty, RF turbinate reduction, Frontal and Maxillary sinuplasty, and cryoabation of posterior nasal tissue.     Sino-Nasal Outcome Test (SNOT - 22)    1. Need to Blow Nose: (P) Very mild  2. Nasal Blockage: (P) Mild or slight  3. Sneezing: (P) None  4. Runny Nose: (P) None  5. Cough: (P) None  6. Post-nasal discharge: (P) Moderate  7. Thick nasal discharge: (P) Very mild  8. Ear fullness: (P) Very mild  9. Dizziness: (P) Very mild  10. Ear Pain: (P) None  11. Facial pain/pressure: (P) Moderate  12. Decreased Sense of Smell/Taste: (P) None  13. Difficulty falling asleep: (P) None  14. Wake up at night: (P) None  15. Lack of a good night's sleep: (P) Moderate  16. Wake up tired: (P) Mild or slight  17. Fatigue: (P) Very  mild  18. Reduced Productivity: (P) None  19. Reduced Concentration: (P) Very mild  20. Frustrated/restless/irritable: (P) None  21. Sad: (P) None  22. Embarrassed: (P) None    Total Score: (P) 19    COPYRIGHT 1996. [facility] [place], [place]         REVIEW OF SYSTEMS    Review of Systems: a 10-system review is reviewed at this encounter.  See scanned document.       No Known Allergies        PHYSICAL EXAM:        HEAD: Normal appearance and symmetry:  No cutaneous lesions.      NOSE:    Dorsum:   straight  Septum: off crest   ITH; 2-3 right 3-4+         ORAL CAVITY/OROPHARYNX:    Lips:  Normal.     NECK:  Trachea:  midline     NEURO:   Alert and Oriented    GAIT AND STATION:  normal     RESPIRATORY:   Symmetry and Respiratory effort    PSYCH:   normal mood and affect    SKIN:  warm and dry         IMPRESSION:   Encounter Diagnoses   Name Primary?     Deviated nasal septum Yes     Hypertrophy of both inferior nasal turbinates      PND (post-nasal drip)      Chronic maxillary sinusitis      Sinusitis chronic, frontal             RECOMMENDATIONS:    Orders Placed This Encounter   Procedures     Case Request: SEPTOPLASTY, NOSE, WITH TURBINOPLASTY + cryoablation of posterior nasla tissues, ENDOSCOPY, NOSE, WITH BALLOON SINUPLASTY (frontal and maxillary)      R/B/A discussed, including failure to resolve symptoms and need for additional procedures.       Again, thank you for allowing me to participate in the care of your patient.        Sincerely,        Fazal Briggs MD

## 2024-10-07 NOTE — PATIENT INSTRUCTIONS
Possible risks from nasal septoplasty:     Continued symptoms, such as nasal obstruction.   Excessive bleeding.   A change in the shape of your nose.   A hole in the septum.   Decrease or loss of smell (temporary or permanent)  Accumulation of blood in the septum that has to be drained.   Temporary numbness in the upper gum, teeth or nose.     Sinus surgery has potential risks and complications:    Failure to resolve symptoms  Pain  Bleeding and/or scar formation  Damage to the eyes or base of the skull (rare)  Loss of sense of smell or taste  The need for additional procedures and/or medical consultations  Empty nose syndrome (excessive dryness and crusting)

## 2024-10-07 NOTE — PROGRESS NOTES
FOLLOW UP VISIT NOTE    Patient presents with:  RECHECK: 6 Month Follow up, Nasal Congestion, PND. Discuss surgery. Last SNOT = 20. Sx have been better since last visit. Today's SNOT = 19. Saw allergy 6/18 and negative testing.        HISTORY OF PRESENT ILLNESS    Tru was seen in follow up after previous 5/6/2024 visit for recheck.    My previous visit:      Orders Placed This Encounter   Medications    sodium chloride-sodium bicarb (SINUS WASH NETI POT) 2300-700 MG KIT       Sig: Spray 1 packet in nostril daily for 90 days Change bottle every 3 months       Dispense:  90 packet       Refill:  0    sulfamethoxazole-trimethoprim (BACTRIM DS) 800-160 MG tablet       Sig: Take 1 tablet by mouth 2 times daily for 24 days       Dispense:  48 tablet       Refill:  0    predniSONE (DELTASONE) 10 MG tablet       Sig: Take 1 tablet (10 mg) by mouth 2 times daily for 10 days       Dispense:  20 tablet       Refill:  0      Return visit in mid fall to discuss BA septoplasty, RF turbinate reduction, Frontal and Maxillary sinuplasty, and cryoabation of posterior nasal tissue.     Sino-Nasal Outcome Test (SNOT - 22)    1. Need to Blow Nose: (P) Very mild  2. Nasal Blockage: (P) Mild or slight  3. Sneezing: (P) None  4. Runny Nose: (P) None  5. Cough: (P) None  6. Post-nasal discharge: (P) Moderate  7. Thick nasal discharge: (P) Very mild  8. Ear fullness: (P) Very mild  9. Dizziness: (P) Very mild  10. Ear Pain: (P) None  11. Facial pain/pressure: (P) Moderate  12. Decreased Sense of Smell/Taste: (P) None  13. Difficulty falling asleep: (P) None  14. Wake up at night: (P) None  15. Lack of a good night's sleep: (P) Moderate  16. Wake up tired: (P) Mild or slight  17. Fatigue: (P) Very mild  18. Reduced Productivity: (P) None  19. Reduced Concentration: (P) Very mild  20. Frustrated/restless/irritable: (P) None  21. Sad: (P) None  22. Embarrassed: (P) None    Total Score: (P) 19    COPYRIGHT 1996. [facility] [employer] [place]  [place], [place]         REVIEW OF SYSTEMS    Review of Systems: a 10-system review is reviewed at this encounter.  See scanned document.       No Known Allergies        PHYSICAL EXAM:        HEAD: Normal appearance and symmetry:  No cutaneous lesions.      NOSE:    Dorsum:   straight  Septum: off crest   ITH; 2-3 right 3-4+         ORAL CAVITY/OROPHARYNX:    Lips:  Normal.     NECK:  Trachea:  midline     NEURO:   Alert and Oriented    GAIT AND STATION:  normal     RESPIRATORY:   Symmetry and Respiratory effort    PSYCH:   normal mood and affect    SKIN:  warm and dry         IMPRESSION:   Encounter Diagnoses   Name Primary?    Deviated nasal septum Yes    Hypertrophy of both inferior nasal turbinates     PND (post-nasal drip)     Chronic maxillary sinusitis     Sinusitis chronic, frontal             RECOMMENDATIONS:    Orders Placed This Encounter   Procedures    Case Request: SEPTOPLASTY, NOSE, WITH TURBINOPLASTY + cryoablation of posterior nasla tissues, ENDOSCOPY, NOSE, WITH BALLOON SINUPLASTY (frontal and maxillary)      R/B/A discussed, including failure to resolve symptoms and need for additional procedures.

## 2024-10-10 ENCOUNTER — TELEPHONE (OUTPATIENT)
Dept: OTOLARYNGOLOGY | Facility: CLINIC | Age: 39
End: 2024-10-10
Payer: COMMERCIAL

## 2024-10-10 NOTE — LETTER
Pre-op Physical: 2/3/2025 arrival of 10:10 am at the [facility] with Caitlin Ruff NP    Surgery Date: 2/27/2025     Location: [place] [address]    Approximate Arrival Time: 10:00 am  (Unless instructed differently by the pre-op call nurse)     Post op Appointment: 3/6/2025 at  8:40 am [facility]    [facility] [address] [place] [address] [address], [place] [address].    Pre-Surgical Tasks:     Schedule a pre-op physical with your primary care doctor if not internal to [facility].  If internal, we have scheduled this.   The pre-op physical must be 10-30 days before surgery and since it is required by anesthesia, your surgery will be cancelled if it's not done.      Review all medications with your primary care or prescribing physician; they will advise you which meds to stop and when, and when you can resume taking.  Certain medications like blood thinners and weight loss medications need to be stopped in advance of surgery to proceed safely.      Blood thinners including but not exclusive to drugs like Xarelto, Eliquis, Warfarin and Aspirin, should be stopped five days before surgery, if your prescribing provider agrees. Follow your provider's advice on stopping blood thinners because they know you best.  If you are unsure if your medication is a blood thinner, ask your prescribing provider.    Weight loss medications: There are multiple medications being used for weight management and diabetes today, and the list is growing.  Phentermine, Ozempic, Wegovy, Trulicity, and other similar medications need to be stopped one week before surgery to avoid being cancelled.  Victoza and Saxenda can be continued longer but must be stopped one full day before surgery.  Please ask your prescribing provider for advice.    Diabetic medications: in addition to the medications talked about above that are used for  either weight loss or diabetes, some people are on insulin that may require adjustment.  Please discuss managing diabetic medications with your prescribing doctor as these medications may require modification prior to surgery.     Please shower the evening before and morning of surgery with Hibiclens soap.  [facility] can provide this to you at no cost, or it can be found at your local pharmacy.     Fasting instructions will be provided by the pre-op nurse who will call you 1-3 days before surgery.  Typically, we advise normal food up to 8 hours before you arrive for surgery. Clear liquids only from then until 2 hours before you arrive surgery, then nothing at all by mouth.  The nurse will review your specific instructions with you at the call.      Smoking impacts your body's ability to heal properly so we advise patients to quit if possible before surgery.  Plastic Surgery patients are required to be nicotine free for at least 8 weeks before surgery.      You will need an adult to drive you home and stay with you 24 hours after surgery. Public transportation or Medical Van Services are not permitted.    Visitor restrictions are subject to change, please verify with the pre-op nurse when they call how many people are permitted to accompany you.    We always encourage you to notify your insurance any time you have medical tests or procedures scheduled including surgery. The number is usually right on the back of your insurance card. To obtain pricing for surgery, please call [facility] Cost of Care at [phone] or email [email or web address].        Call our office if you have any questions! Thank you!     Yahaira Olivares MA  [facility]   (General Surgery/ ENT/ Plastics)  Direct Office: [phone]

## 2024-10-25 NOTE — TELEPHONE ENCOUNTER
Spoke with patient today regarding surgery scheduling      Went over details/instructions.    Surgery Letter sent via [employer]  (Please see LETTERS TAB in chart to retrieve a copy of this letter)

## 2025-02-03 ENCOUNTER — TELEPHONE (OUTPATIENT)
Dept: FAMILY MEDICINE | Facility: CLINIC | Age: 40
End: 2025-02-03

## 2025-02-03 ENCOUNTER — OFFICE VISIT (OUTPATIENT)
Dept: FAMILY MEDICINE | Facility: CLINIC | Age: 40
End: 2025-02-03
Payer: COMMERCIAL

## 2025-02-03 VITALS
BODY MASS INDEX: 22.26 KG/M2 | TEMPERATURE: 98.1 F | HEART RATE: 72 BPM | SYSTOLIC BLOOD PRESSURE: 107 MMHG | DIASTOLIC BLOOD PRESSURE: 72 MMHG | RESPIRATION RATE: 16 BRPM | WEIGHT: 159 LBS | HEIGHT: 71 IN | OXYGEN SATURATION: 99 %

## 2025-02-03 DIAGNOSIS — Z01.818 PREOP GENERAL PHYSICAL EXAM: Primary | ICD-10-CM

## 2025-02-03 DIAGNOSIS — B35.6 JOCK ITCH: ICD-10-CM

## 2025-02-03 DIAGNOSIS — R09.81 NASAL CONGESTION: ICD-10-CM

## 2025-02-03 DIAGNOSIS — J34.2 DEVIATED NASAL SEPTUM: ICD-10-CM

## 2025-02-03 PROCEDURE — 90471 IMMUNIZATION ADMIN: CPT | Performed by: PHYSICIAN ASSISTANT

## 2025-02-03 PROCEDURE — G2211 COMPLEX E/M VISIT ADD ON: HCPCS | Performed by: PHYSICIAN ASSISTANT

## 2025-02-03 PROCEDURE — 90715 TDAP VACCINE 7 YRS/> IM: CPT | Performed by: PHYSICIAN ASSISTANT

## 2025-02-03 PROCEDURE — 90480 ADMN SARSCOV2 VAC 1/ONLY CMP: CPT | Performed by: PHYSICIAN ASSISTANT

## 2025-02-03 PROCEDURE — 99214 OFFICE O/P EST MOD 30 MIN: CPT | Mod: 25 | Performed by: PHYSICIAN ASSISTANT

## 2025-02-03 PROCEDURE — 91320 SARSCV2 VAC 30MCG TRS-SUC IM: CPT | Performed by: PHYSICIAN ASSISTANT

## 2025-02-03 NOTE — TELEPHONE ENCOUNTER
[facility] pharmacy called in to see how long this powder should last him, OR how much he should be applying per application.   Please advise.    After speaking with Caitlin about this she stated the bottle should last him for about a month. I am calling to let pharmacy know now. Pharmacy is aware.

## 2025-02-03 NOTE — PROGRESS NOTES
Prior to immunization administration, verified patients identity using patient s name and date of birth. Please see Immunization Activity for additional information.     Screening Questionnaire for Adult Immunization    Are you sick today?   No   Do you have allergies to medications, food, a vaccine component or latex?   No   Have you ever had a serious reaction after receiving a vaccination?   No   Do you have a long-term health problem with heart, lung, kidney, or metabolic disease (e.g., diabetes), asthma, a blood disorder, no spleen, complement component deficiency, a cochlear implant, or a spinal fluid leak?  Are you on long-term aspirin therapy?   No   Do you have cancer, leukemia, HIV/AIDS, or any other immune system problem?   No   Do you have a parent, brother, or sister with an immune system problem?   No   In the past 3 months, have you taken medications that affect  your immune system, such as prednisone, other steroids, or anticancer drugs; drugs for the treatment of rheumatoid arthritis, Crohn s disease, or psoriasis; or have you had radiation treatments?   No   Have you had a seizure, or a brain or other nervous system problem?   No   During the past year, have you received a transfusion of blood or blood    products, or been given immune (gamma) globulin or antiviral drug?   No   For women: Are you pregnant or is there a chance you could become       pregnant during the next month?   No   Have you received any vaccinations in the past 4 weeks?   No     Immunization questionnaire answers were all negative.      Patient instructed to remain in clinic for 15 minutes afterwards, and to report any adverse reactions.     Screening performed by Amelia Metz MA on 2/3/2025 at 10:40 AM.

## 2025-02-03 NOTE — PROGRESS NOTES
Preoperative Evaluation  [facility]  [address] [facility] [address]  Phone: [phone]  Fax: [phone]  Primary Provider: Caitlin Ruff PA-C  Pre-op Performing Provider: Caitiln Ruff PA-C  Feb 3, 2025             1/31/2025   Surgical Information   What procedure is being done? SEPTOPLASTY, NOSE, WITH TURBINOPLASTY and cryoablation of posterior nasal tissues N/A General   ENDOSCOPY, NOSE, WITH BALLOON frontal and maxillary SINUPLASTY        Facility or Hospital where procedure/surgery will be performed: [facility]   Who is doing the procedure / surgery? Dr Briggs   Date of surgery / procedure: 2/27/25   Time of surgery / procedure: AM   Where do you plan to recover after surgery? at home with family     Fax number for surgical facility: Note does not need to be faxed, will be available electronically in Epic.    Assessment & Plan     The proposed surgical procedure is considered LOW risk.    Preop general physical exam  Nasal congestion  Deviated nasal septum  Patient is here today for ENT surgery pre op.  Medications and NPO recommendations reviewed.  No labs indicated.  - COVID-19 12+ (PFIZER)  - TDAP 10-64Y (ADACEL,BOOSTRIX)    Jock itch  Patient has persistent jock itch, will treat with topical powder. Follow up if symptoms worsen or do not improve.  - miconazole (MICATIN) 2 % external powder  Dispense: 71 g; Refill: 0        Possible Sleep Apnea: tested and was negative for sleep apnea, but does snore        - No identified additional risk factors other than previously addressed    Preoperative Medication Instructions  Antiplatelet or Anticoagulation Medication Instructions   - Patient is on no antiplatelet or anticoagulation medications.    Additional Medication Instructions  Patient is on no additional chronic medications    Recommendation  Approval given to proceed with proposed procedure, without further diagnostic evaluation.    The  longitudinal plan of care for the diagnosis(es)/condition(s) as documented were addressed during this visit. Due to the added complexity in care, I will continue to support the patient in the subsequent management and ongoing continuity of care.      Dav Ott is a 39 year old, presenting for the following:  Pre-Op Exam (Questions about surgery,  has not had a covid vaccine yet and also a very minor case of jock itch.  )          2/3/2025    10:19 AM   Additional Questions   Roomed by SE Metz CMA([facility])     HPI related to upcoming procedure: patient having deviated septum repair and also sinus enlargement due to some narrowing in sinus        1/31/2025   Pre-Op Questionnaire   Have you ever had a heart attack or stroke? No   Have you ever had surgery on your heart or blood vessels, such as a stent placement, a coronary artery bypass, or surgery on an artery in your head, neck, heart, or legs? No   Do you have chest pain with activity? No   Do you have a history of heart failure? No   Do you currently have a cold, bronchitis or symptoms of other infection? (!) UNKNOWN - sinus issues   Do you have a cough, shortness of breath, or wheezing? No   Do you or anyone in your family have previous history of blood clots? No   Do you or does anyone in your family have a serious bleeding problem such as prolonged bleeding following surgeries or cuts? No   Have you ever had problems with anemia or been told to take iron pills? No   Have you had any abnormal blood loss such as black, tarry or bloody stools? No   Have you ever had a blood transfusion? No   Are you willing to have a blood transfusion if it is medically needed before, during, or after your surgery? Yes   Have you or any of your relatives ever had problems with anesthesia? No   Do you have sleep apnea, excessive snoring or daytime drowsiness? (!) YES- snoring   Do you have a CPAP machine? (!) NO    Do you have any artifical heart valves or other implanted  medical devices like a pacemaker, defibrillator, or continuous glucose monitor? No   Do you have artificial joints? No   Are you allergic to latex? No     Health Care Directive  Patient does not have a Health Care Directive: Discussed advance care planning with patient; however, patient declined at this time.    Preoperative Review of    reviewed - no record of controlled substances prescribed.      Status of Chronic Conditions:  See problem list for active medical problems.  Problems all longstanding and stable, except as noted/documented.  See ROS for pertinent symptoms related to these conditions.    Patient Active Problem List    Diagnosis Date Noted    Migraine headache 04/05/2021     Priority: Medium     Formatting of this note might be different from the original.  Created by Conversion    Replacement Utility updated for latest IMO load      Other malaise and fatigue 04/05/2021     Priority: Medium     Formatting of this note might be different from the original.  Created by Conversion      Pectus excavatum 04/05/2021     Priority: Medium     Formatting of this note might be different from the original.  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  Created by Conversion        No past medical history on file.  No past surgical history on file.  Current Outpatient Medications   Medication Sig Dispense Refill    azelastine (ASTELIN) 0.1 % nasal spray 2 sprays each nostril 1-2x daily as needed for nasal congestion (use nightly for first 2 week) 30 mL 11    Hypertonic Nasal Wash (SINUS RINSE KIT) PACK [place] 1 packet in nostril daily for 90 days Change bottle every 3 months*      ipratropium (ATROVENT) 0.03 % nasal spray Spray 2 sprays into both nostrils 3 times daily as needed for rhinitis (post nasal drip)*      ketoconazole (NIZORAL) 2 % external cream APPLY THIN LAYER TO AFFECTED AREAS ON THE FACE 2X DAILY  "UNTIL RESOLVED THEN SLOWLY TAPER OFF.      ketoconazole (NIZORAL) 2 % external shampoo WASH AFFECTED AREAS IN GROIN 2-3 WEEKLY. LATHER AND LET SIT 2-3 MINUTES BEFORE RINSING      MAGNESIUM OXIDE PO Take by mouth.      metroNIDAZOLE (METROCREAM) 0.75 % external cream APPLY A THIN LAYER TO THE AFFECTED AREAS ON FACE 1-2X DAILY, ONGOING.      Sulfacetamide Sodium-Sulfur 8-4 % SUSP WASH FACE 1-2X DAILY. LATHER AND LET SIT FOR SEVERAL MINUTES PRIOR RINSING.      meloxicam (MOBIC) 7.5 MG tablet Take 1 tablet (7.5 mg) by mouth daily for 30 days 30 tablet 0       No Known Allergies     Social History     Tobacco Use    Smoking status: Never     Passive exposure: Past    Smokeless tobacco: Never   Substance Use Topics    Alcohol use: Yes     History   Drug Use No             Review of Systems  Constitutional, HEENT, cardiovascular, pulmonary, gi and gu systems are negative, except as otherwise noted.    Objective    /72   Pulse 72   Temp 98.1  F (36.7  C) (Oral)   Resp 16   Ht 1.803 m (5' 11\")   Wt 72.1 kg (159 lb)   SpO2 99%   BMI 22.18 kg/m     Estimated body mass index is 22.18 kg/m  as calculated from the following:    Height as of this encounter: 1.803 m (5' 11\").    Weight as of this encounter: 72.1 kg (159 lb).  Physical Exam  GENERAL: alert and no distress  NECK: no adenopathy, no asymmetry, masses, or scars  RESP: lungs clear to auscultation - no rales, rhonchi or wheezes  CV: regular rate and rhythm, normal S1 S2, no S3 or S4, no murmur, click or rub, no peripheral edema  ABDOMEN: soft, nontender, no hepatosplenomegaly, no masses and bowel sounds normal  MS: no gross musculoskeletal defects noted, no edema    Recent Labs   Lab Test 05/08/24  0747   HGB 14.8         POTASSIUM 4.4   CR 1.09   A1C 5.3        Diagnostics  No labs were ordered during this visit.   No EKG required for low risk surgery (cataract, skin procedure, breast biopsy, etc).    Revised Cardiac Risk Index (RCRI)  The " patient has the following serious cardiovascular risks for perioperative complications:   - No serious cardiac risks = 0 points     RCRI Interpretation: 0 points: Class I (very low risk - 0.4% complication rate)         Signed Electronically by: Caitlin Ruff PA-C  A copy of this evaluation report is provided to the requesting physician.

## 2025-02-03 NOTE — PATIENT INSTRUCTIONS
How to Take Your Medication Before Surgery  Preoperative Medication Instructions   Antiplatelet or Anticoagulation Medication Instructions   - Patient is on no antiplatelet or anticoagulation medications.    Additional Medication Instructions  Patient is on no additional chronic medications       Tylenol is okay for pain, 7-10 days prior to surgery hold any sot of NSAID (Ibuprofen, Aleve).    Patient Education   Preparing for Your Surgery  For Adults  Getting started  In most cases, a nurse will call to review your health history and instructions. They will give you an arrival time based on your scheduled surgery time. Please be ready to share:  Your doctor's clinic name and phone number  Your medical, surgical, and anesthesia history  A list of allergies and sensitivities  A list of medicines, including herbal treatments and over-the-counter drugs  Whether the patient has a legal guardian (ask how to send us the papers in advance)  Note: You may not receive a call if you were seen at our PAC (Preoperative Assessment Center).  Please tell us if you're pregnant--or if there's any chance you might be pregnant. Some surgeries may injure a fetus (unborn baby), so they require a pregnancy test. Surgeries that are safe for a fetus don't always need a test, and you can choose whether to have one.   Preparing for surgery  Within 10 to 30 days of surgery: Have a pre-op exam (sometimes called an H&P, or History and Physical). This can be done at a clinic or pre-operative center.  If you're having a , you may not need this exam. Talk to your care team.  At your pre-op exam, talk to your care team about all medicines you take. (This includes CBD oil and any drugs, such as THC, marijuana, and other forms of cannabis.) If you need to stop any medicine before surgery, ask when to start taking it again.  This is for your safety. Many medicines and drugs can make you bleed too much during surgery. Some change how well  surgery (anesthesia) drugs work.  Call your insurance company to let them know you're having surgery. (If you don't have insurance, call [phone].)  Call your clinic if there's any change in your health. This includes a scrape or scratch near the surgery site, or any signs of a cold (sore throat, runny nose, cough, rash, fever).  Eating and drinking guidelines  For your safety: Unless your surgeon tells you otherwise, follow the guidelines below.  Eat and drink as normal until 8 hours before you arrive for surgery. After that, no food or milk. You can spit out gum when you arrive.  Drink clear liquids until 2 hours before you arrive. These are liquids you can see through, like water, Gatorade, and Propel Water. They also include plain black coffee and tea (no cream or milk).  No alcohol for 24 hours before you arrive. The night before surgery, stop any drinks that contain THC.  If your care team tells you to take medicine on the morning of surgery, it's okay to take it with a sip of water. No other medicines or drugs are allowed (including CBD oil)--follow your care team's instructions.  If you have questions the day of surgery, call your hospital or surgery center.   Preventing infection  Shower or bathe the night before and the morning of surgery. Follow the instructions your clinic gave you. (If no instructions, use regular soap.)  Don't shave or clip hair near your surgery site. We'll remove the hair if needed.  Don't smoke or vape the morning of surgery. No chewing tobacco for 6 hours before you arrive. A nicotine patch is okay. You may spit out nicotine gum when you arrive.  For some surgeries, the surgeon will tell you to fully quit smoking and nicotine.  We will make every effort to keep you safe from infection. We will:  Clean our hands often with soap and water (or an alcohol-based hand rub).  Clean the skin at your surgery site with a special soap that kills germs.  Give you a special gown to keep you  warm. (Cold raises the risk of infection.)  Wear hair covers, masks, gowns, and gloves during surgery.  Give antibiotic medicine, if prescribed. Not all surgeries need this medicine.  What to bring on the day of surgery  Photo ID and insurance card  Copy of your health care directive, if you have one  Glasses and hearing aids (bring cases)  You can't wear contacts during surgery  Inhaler and eye drops, if you use them (tell us about these when you arrive)  CPAP machine or breathing device, if you use them  A few personal items, if spending the night  If you have . . .  A pacemaker, ICD (cardiac defibrillator), or other implant: Bring the ID card.  An implanted stimulator: Bring the remote control.  A legal guardian: Bring a copy of the certified (court-stamped) guardianship papers.  Please remove any jewelry, including body piercings. Leave jewelry and other valuables at home.  If you're going home the day of surgery  You must have a responsible adult drive you home. They should stay with you overnight as well.  If you don't have someone to stay with you, and you aren't safe to go home alone, we may keep you overnight. Insurance often won't pay for this.  After surgery  If it's hard to control your pain or you need more pain medicine, please call your surgeon's office.  Questions?   If you have any questions for your care team, list them here:   ____________________________________________________________________________________________________________________________________________________________________________________________________________________________________________________________  For informational purposes only. Not to replace the advice of your health care provider. Copyright   2003, 2019 [facility]. All rights reserved. Clinically reviewed by Praneeth Almodovar MD. [employer] [id number] - REV 08/24.

## 2025-02-03 NOTE — TELEPHONE ENCOUNTER
Pharmacy called back with a follow up question. Stated the prescription needs to state the maximum number of times medication can be applied per day

## 2025-02-03 NOTE — H&P (VIEW-ONLY)
Preoperative Evaluation  [facility]  [address] [facility] [address]  Phone: [phone]  Fax: [phone]  Primary Provider: Caitlin Ruff PA-C  Pre-op Performing Provider: Caitlin Ruff PA-C  Feb 3, 2025             1/31/2025   Surgical Information   What procedure is being done? SEPTOPLASTY, NOSE, WITH TURBINOPLASTY and cryoablation of posterior nasal tissues N/A General   ENDOSCOPY, NOSE, WITH BALLOON frontal and maxillary SINUPLASTY        Facility or Hospital where procedure/surgery will be performed: [facility]   Who is doing the procedure / surgery? Dr Briggs   Date of surgery / procedure: 2/27/25   Time of surgery / procedure: AM   Where do you plan to recover after surgery? at home with family     Fax number for surgical facility: Note does not need to be faxed, will be available electronically in Epic.    Assessment & Plan     The proposed surgical procedure is considered LOW risk.    Preop general physical exam  Nasal congestion  Deviated nasal septum  Patient is here today for ENT surgery pre op.  Medications and NPO recommendations reviewed.  No labs indicated.  - COVID-19 12+ (PFIZER)  - TDAP 10-64Y (ADACEL,BOOSTRIX)    Jock itch  Patient has persistent jock itch, will treat with topical powder. Follow up if symptoms worsen or do not improve.  - miconazole (MICATIN) 2 % external powder  Dispense: 71 g; Refill: 0        Possible Sleep Apnea: tested and was negative for sleep apnea, but does snore        - No identified additional risk factors other than previously addressed    Preoperative Medication Instructions  Antiplatelet or Anticoagulation Medication Instructions   - Patient is on no antiplatelet or anticoagulation medications.    Additional Medication Instructions  Patient is on no additional chronic medications    Recommendation  Approval given to proceed with proposed procedure, without further diagnostic evaluation.    The  longitudinal plan of care for the diagnosis(es)/condition(s) as documented were addressed during this visit. Due to the added complexity in care, I will continue to support the patient in the subsequent management and ongoing continuity of care.      Dav Ott is a 39 year old, presenting for the following:  Pre-Op Exam (Questions about surgery,  has not had a covid vaccine yet and also a very minor case of jock itch.  )          2/3/2025    10:19 AM   Additional Questions   Roomed by SE Metz CMA([facility])     HPI related to upcoming procedure: patient having deviated septum repair and also sinus enlargement due to some narrowing in sinus        1/31/2025   Pre-Op Questionnaire   Have you ever had a heart attack or stroke? No   Have you ever had surgery on your heart or blood vessels, such as a stent placement, a coronary artery bypass, or surgery on an artery in your head, neck, heart, or legs? No   Do you have chest pain with activity? No   Do you have a history of heart failure? No   Do you currently have a cold, bronchitis or symptoms of other infection? (!) UNKNOWN - sinus issues   Do you have a cough, shortness of breath, or wheezing? No   Do you or anyone in your family have previous history of blood clots? No   Do you or does anyone in your family have a serious bleeding problem such as prolonged bleeding following surgeries or cuts? No   Have you ever had problems with anemia or been told to take iron pills? No   Have you had any abnormal blood loss such as black, tarry or bloody stools? No   Have you ever had a blood transfusion? No   Are you willing to have a blood transfusion if it is medically needed before, during, or after your surgery? Yes   Have you or any of your relatives ever had problems with anesthesia? No   Do you have sleep apnea, excessive snoring or daytime drowsiness? (!) YES- snoring   Do you have a CPAP machine? (!) NO    Do you have any artifical heart valves or other implanted  medical devices like a pacemaker, defibrillator, or continuous glucose monitor? No   Do you have artificial joints? No   Are you allergic to latex? No     Health Care Directive  Patient does not have a Health Care Directive: Discussed advance care planning with patient; however, patient declined at this time.    Preoperative Review of    reviewed - no record of controlled substances prescribed.      Status of Chronic Conditions:  See problem list for active medical problems.  Problems all longstanding and stable, except as noted/documented.  See ROS for pertinent symptoms related to these conditions.    Patient Active Problem List    Diagnosis Date Noted    Migraine headache 04/05/2021     Priority: Medium     Formatting of this note might be different from the original.  Created by Conversion    Replacement Utility updated for latest IMO load      Other malaise and fatigue 04/05/2021     Priority: Medium     Formatting of this note might be different from the original.  Created by Conversion      Pectus excavatum 04/05/2021     Priority: Medium     Formatting of this note might be different from the original.  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  Created by Conversion        No past medical history on file.  No past surgical history on file.  Current Outpatient Medications   Medication Sig Dispense Refill    azelastine (ASTELIN) 0.1 % nasal spray 2 sprays each nostril 1-2x daily as needed for nasal congestion (use nightly for first 2 week) 30 mL 11    Hypertonic Nasal Wash (SINUS RINSE KIT) PACK [place] 1 packet in nostril daily for 90 days Change bottle every 3 months*      ipratropium (ATROVENT) 0.03 % nasal spray Spray 2 sprays into both nostrils 3 times daily as needed for rhinitis (post nasal drip)*      ketoconazole (NIZORAL) 2 % external cream APPLY THIN LAYER TO AFFECTED AREAS ON THE FACE 2X DAILY  "UNTIL RESOLVED THEN SLOWLY TAPER OFF.      ketoconazole (NIZORAL) 2 % external shampoo WASH AFFECTED AREAS IN GROIN 2-3 WEEKLY. LATHER AND LET SIT 2-3 MINUTES BEFORE RINSING      MAGNESIUM OXIDE PO Take by mouth.      metroNIDAZOLE (METROCREAM) 0.75 % external cream APPLY A THIN LAYER TO THE AFFECTED AREAS ON FACE 1-2X DAILY, ONGOING.      Sulfacetamide Sodium-Sulfur 8-4 % SUSP WASH FACE 1-2X DAILY. LATHER AND LET SIT FOR SEVERAL MINUTES PRIOR RINSING.      meloxicam (MOBIC) 7.5 MG tablet Take 1 tablet (7.5 mg) by mouth daily for 30 days 30 tablet 0       No Known Allergies     Social History     Tobacco Use    Smoking status: Never     Passive exposure: Past    Smokeless tobacco: Never   Substance Use Topics    Alcohol use: Yes     History   Drug Use No             Review of Systems  Constitutional, HEENT, cardiovascular, pulmonary, gi and gu systems are negative, except as otherwise noted.    Objective    /72   Pulse 72   Temp 98.1  F (36.7  C) (Oral)   Resp 16   Ht 1.803 m (5' 11\")   Wt 72.1 kg (159 lb)   SpO2 99%   BMI 22.18 kg/m     Estimated body mass index is 22.18 kg/m  as calculated from the following:    Height as of this encounter: 1.803 m (5' 11\").    Weight as of this encounter: 72.1 kg (159 lb).  Physical Exam  GENERAL: alert and no distress  NECK: no adenopathy, no asymmetry, masses, or scars  RESP: lungs clear to auscultation - no rales, rhonchi or wheezes  CV: regular rate and rhythm, normal S1 S2, no S3 or S4, no murmur, click or rub, no peripheral edema  ABDOMEN: soft, nontender, no hepatosplenomegaly, no masses and bowel sounds normal  MS: no gross musculoskeletal defects noted, no edema    Recent Labs   Lab Test 05/08/24  0747   HGB 14.8         POTASSIUM 4.4   CR 1.09   A1C 5.3        Diagnostics  No labs were ordered during this visit.   No EKG required for low risk surgery (cataract, skin procedure, breast biopsy, etc).    Revised Cardiac Risk Index (RCRI)  The " patient has the following serious cardiovascular risks for perioperative complications:   - No serious cardiac risks = 0 points     RCRI Interpretation: 0 points: Class I (very low risk - 0.4% complication rate)         Signed Electronically by: Caitlin Ruff PA-C  A copy of this evaluation report is provided to the requesting physician.

## 2025-02-20 ENCOUNTER — TELEPHONE (OUTPATIENT)
Dept: OTOLARYNGOLOGY | Facility: CLINIC | Age: 40
End: 2025-02-20
Payer: COMMERCIAL

## 2025-02-20 DIAGNOSIS — R09.81 CONGESTION OF PARANASAL SINUS: Primary | ICD-10-CM

## 2025-02-20 NOTE — TELEPHONE ENCOUNTER
Left message for pt to call back- pt needs updated CT scan prior to his surgery with Dr. Briggs next week. Order was placed STAT, if he doesn't schedule, surgery will likely be cancelled by the procedure center.  Winnie Schroeder RN on 2/20/2025 at 11:55 AM

## 2025-02-21 NOTE — TELEPHONE ENCOUNTER
Left message with phone number for imaging.    Dee Foss RN Care Coordinator, [facility] 02/21/25 11:42 AM

## 2025-02-24 ENCOUNTER — HOSPITAL ENCOUNTER (OUTPATIENT)
Dept: CT IMAGING | Facility: HOSPITAL | Age: 40
Discharge: HOME | End: 2025-02-24
Attending: OTOLARYNGOLOGY | Admitting: OTOLARYNGOLOGY
Payer: COMMERCIAL

## 2025-02-24 DIAGNOSIS — R09.81 CONGESTION OF PARANASAL SINUS: ICD-10-CM

## 2025-02-24 PROCEDURE — 70486 CT MAXILLOFACIAL W/O DYE: CPT

## 2025-02-27 ENCOUNTER — ANESTHESIA (OUTPATIENT)
Dept: SURGERY | Facility: HOSPITAL | Age: 40
End: 2025-02-27
Payer: COMMERCIAL

## 2025-02-27 ENCOUNTER — HOSPITAL ENCOUNTER (OUTPATIENT)
Facility: HOSPITAL | Age: 40
Discharge: HOME | End: 2025-02-27
Attending: OTOLARYNGOLOGY | Admitting: OTOLARYNGOLOGY
Payer: COMMERCIAL

## 2025-02-27 ENCOUNTER — ANESTHESIA EVENT (OUTPATIENT)
Dept: SURGERY | Facility: HOSPITAL | Age: 40
End: 2025-02-27
Payer: COMMERCIAL

## 2025-02-27 VITALS
RESPIRATION RATE: 16 BRPM | SYSTOLIC BLOOD PRESSURE: 131 MMHG | BODY MASS INDEX: 21.92 KG/M2 | OXYGEN SATURATION: 96 % | WEIGHT: 157.2 LBS | DIASTOLIC BLOOD PRESSURE: 82 MMHG | HEART RATE: 77 BPM | TEMPERATURE: 97.9 F

## 2025-02-27 DIAGNOSIS — Z98.890 S/P FESS (FUNCTIONAL ENDOSCOPIC SINUS SURGERY): Primary | ICD-10-CM

## 2025-02-27 PROCEDURE — 258N000003 HC RX IP 258 OP 636: Performed by: REGISTERED NURSE

## 2025-02-27 PROCEDURE — 360N000077 HC SURGERY LEVEL 4, PER MIN: Performed by: OTOLARYNGOLOGY

## 2025-02-27 PROCEDURE — C1725 CATH, TRANSLUMIN NON-LASER: HCPCS | Performed by: OTOLARYNGOLOGY

## 2025-02-27 PROCEDURE — 250N000009 HC RX 250: Performed by: REGISTERED NURSE

## 2025-02-27 PROCEDURE — 370N000017 HC ANESTHESIA TECHNICAL FEE, PER MIN: Performed by: OTOLARYNGOLOGY

## 2025-02-27 PROCEDURE — 710N000012 HC RECOVERY PHASE 2, PER MINUTE: Performed by: OTOLARYNGOLOGY

## 2025-02-27 PROCEDURE — 250N000009 HC RX 250: Performed by: OTOLARYNGOLOGY

## 2025-02-27 PROCEDURE — 250N000025 HC SEVOFLURANE, PER MIN: Performed by: OTOLARYNGOLOGY

## 2025-02-27 PROCEDURE — 31295 NSL/SINS NDSC SURG MAX SINS: CPT | Mod: 50 | Performed by: OTOLARYNGOLOGY

## 2025-02-27 PROCEDURE — 258N000003 HC RX IP 258 OP 636: Performed by: PAIN MEDICINE

## 2025-02-27 PROCEDURE — 30140 RESECT INFERIOR TURBINATE: CPT | Mod: 50 | Performed by: OTOLARYNGOLOGY

## 2025-02-27 PROCEDURE — 250N000011 HC RX IP 250 OP 636: Performed by: OTOLARYNGOLOGY

## 2025-02-27 PROCEDURE — C2618 PROBE/NEEDLE, CRYO: HCPCS | Performed by: OTOLARYNGOLOGY

## 2025-02-27 PROCEDURE — 272N000001 HC OR GENERAL SUPPLY STERILE: Performed by: OTOLARYNGOLOGY

## 2025-02-27 PROCEDURE — 250N000010 HC RX IP 250 OP 272: Performed by: OTOLARYNGOLOGY

## 2025-02-27 PROCEDURE — 61782 SCAN PROC CRANIAL EXTRA: CPT | Performed by: OTOLARYNGOLOGY

## 2025-02-27 PROCEDURE — C1726 CATH, BAL DIL, NON-VASCULAR: HCPCS | Performed by: OTOLARYNGOLOGY

## 2025-02-27 PROCEDURE — 31296 NSL/SINS NDSC SURG FRNT SINS: CPT | Mod: 50 | Performed by: OTOLARYNGOLOGY

## 2025-02-27 PROCEDURE — 250N000013 HC RX MED GY IP 250 OP 250 PS 637: Performed by: OTOLARYNGOLOGY

## 2025-02-27 PROCEDURE — 710N000009 HC RECOVERY PHASE 1, LEVEL 1, PER MIN: Performed by: OTOLARYNGOLOGY

## 2025-02-27 PROCEDURE — 250N000011 HC RX IP 250 OP 636: Performed by: PAIN MEDICINE

## 2025-02-27 PROCEDURE — 999N000141 HC STATISTIC PRE-PROCEDURE NURSING ASSESSMENT: Performed by: OTOLARYNGOLOGY

## 2025-02-27 PROCEDURE — 250N000011 HC RX IP 250 OP 636: Performed by: REGISTERED NURSE

## 2025-02-27 PROCEDURE — 31243 NSL/SINUS NDSC CRYOABLTJ PNN: CPT | Performed by: OTOLARYNGOLOGY

## 2025-02-27 PROCEDURE — 30520 REPAIR OF NASAL SEPTUM: CPT | Performed by: OTOLARYNGOLOGY

## 2025-02-27 PROCEDURE — 272N000002 HC OR SUPPLY OTHER OPNP: Performed by: OTOLARYNGOLOGY

## 2025-02-27 RX ORDER — FENTANYL CITRATE 50 UG/ML
INJECTION, SOLUTION INTRAMUSCULAR; INTRAVENOUS PRN
Status: DISCONTINUED | OUTPATIENT
Start: 2025-02-27 | End: 2025-02-27

## 2025-02-27 RX ORDER — DEXAMETHASONE SODIUM PHOSPHATE 10 MG/ML
10 INJECTION, SOLUTION INTRAMUSCULAR; INTRAVENOUS ONCE
Status: COMPLETED | OUTPATIENT
Start: 2025-02-27 | End: 2025-02-27

## 2025-02-27 RX ORDER — NALOXONE HYDROCHLORIDE 0.4 MG/ML
0.4 INJECTION, SOLUTION INTRAMUSCULAR; INTRAVENOUS; SUBCUTANEOUS
Status: DISCONTINUED | OUTPATIENT
Start: 2025-02-27 | End: 2025-02-27 | Stop reason: HOSPADM

## 2025-02-27 RX ORDER — SODIUM CHLORIDE, SODIUM LACTATE, POTASSIUM CHLORIDE, CALCIUM CHLORIDE 600; 310; 30; 20 MG/100ML; MG/100ML; MG/100ML; MG/100ML
INJECTION, SOLUTION INTRAVENOUS CONTINUOUS
Status: DISCONTINUED | OUTPATIENT
Start: 2025-02-27 | End: 2025-02-27 | Stop reason: HOSPADM

## 2025-02-27 RX ORDER — NALOXONE HYDROCHLORIDE 0.4 MG/ML
0.1 INJECTION, SOLUTION INTRAMUSCULAR; INTRAVENOUS; SUBCUTANEOUS
Status: DISCONTINUED | OUTPATIENT
Start: 2025-02-27 | End: 2025-02-27 | Stop reason: HOSPADM

## 2025-02-27 RX ORDER — NALOXONE HYDROCHLORIDE 1 MG/ML
0.1 INJECTION INTRAMUSCULAR; INTRAVENOUS; SUBCUTANEOUS
Status: DISCONTINUED | OUTPATIENT
Start: 2025-02-27 | End: 2025-02-27 | Stop reason: HOSPADM

## 2025-02-27 RX ORDER — FENTANYL CITRATE 50 UG/ML
50 INJECTION, SOLUTION INTRAMUSCULAR; INTRAVENOUS EVERY 5 MIN PRN
Status: DISCONTINUED | OUTPATIENT
Start: 2025-02-27 | End: 2025-02-27 | Stop reason: HOSPADM

## 2025-02-27 RX ORDER — ONDANSETRON 2 MG/ML
4 INJECTION INTRAMUSCULAR; INTRAVENOUS EVERY 30 MIN PRN
Status: DISCONTINUED | OUTPATIENT
Start: 2025-02-27 | End: 2025-02-27 | Stop reason: HOSPADM

## 2025-02-27 RX ORDER — DEXAMETHASONE SODIUM PHOSPHATE 4 MG/ML
4 INJECTION, SOLUTION INTRA-ARTICULAR; INTRALESIONAL; INTRAMUSCULAR; INTRAVENOUS; SOFT TISSUE
Status: DISCONTINUED | OUTPATIENT
Start: 2025-02-27 | End: 2025-02-27 | Stop reason: HOSPADM

## 2025-02-27 RX ORDER — PROPOFOL 10 MG/ML
INJECTION, EMULSION INTRAVENOUS CONTINUOUS PRN
Status: DISCONTINUED | OUTPATIENT
Start: 2025-02-27 | End: 2025-02-27

## 2025-02-27 RX ORDER — OXYCODONE HYDROCHLORIDE 5 MG/1
5 TABLET ORAL
Status: DISCONTINUED | OUTPATIENT
Start: 2025-02-27 | End: 2025-02-27 | Stop reason: HOSPADM

## 2025-02-27 RX ORDER — FENTANYL CITRATE 50 UG/ML
25 INJECTION, SOLUTION INTRAMUSCULAR; INTRAVENOUS EVERY 5 MIN PRN
Status: DISCONTINUED | OUTPATIENT
Start: 2025-02-27 | End: 2025-02-27 | Stop reason: HOSPADM

## 2025-02-27 RX ORDER — METHYLPREDNISOLONE 4 MG/1
TABLET ORAL
Qty: 21 EACH | Refills: 0 | Status: SHIPPED | OUTPATIENT
Start: 2025-02-27

## 2025-02-27 RX ORDER — LIDOCAINE 40 MG/G
CREAM TOPICAL
Status: DISCONTINUED | OUTPATIENT
Start: 2025-02-27 | End: 2025-02-27 | Stop reason: HOSPADM

## 2025-02-27 RX ORDER — OXYCODONE AND ACETAMINOPHEN 5; 325 MG/1; MG/1
1-2 TABLET ORAL EVERY 6 HOURS PRN
Qty: 12 TABLET | Refills: 0 | Status: SHIPPED | OUTPATIENT
Start: 2025-02-27 | End: 2025-02-27

## 2025-02-27 RX ORDER — OXYMETAZOLINE HYDROCHLORIDE 0.05 G/100ML
2 SPRAY NASAL ONCE
Status: COMPLETED | OUTPATIENT
Start: 2025-02-27 | End: 2025-02-27

## 2025-02-27 RX ORDER — MUPIROCIN 20 MG/G
OINTMENT TOPICAL
Qty: 22 G | Refills: 0 | Status: SHIPPED | OUTPATIENT
Start: 2025-02-27

## 2025-02-27 RX ORDER — NALOXONE HYDROCHLORIDE 0.4 MG/ML
0.2 INJECTION, SOLUTION INTRAMUSCULAR; INTRAVENOUS; SUBCUTANEOUS
Status: DISCONTINUED | OUTPATIENT
Start: 2025-02-27 | End: 2025-02-27 | Stop reason: HOSPADM

## 2025-02-27 RX ORDER — OXYCODONE AND ACETAMINOPHEN 5; 325 MG/1; MG/1
1-2 TABLET ORAL ONCE
Status: COMPLETED | OUTPATIENT
Start: 2025-02-27 | End: 2025-02-27

## 2025-02-27 RX ORDER — LIDOCAINE HYDROCHLORIDE 10 MG/ML
INJECTION, SOLUTION INFILTRATION; PERINEURAL PRN
Status: DISCONTINUED | OUTPATIENT
Start: 2025-02-27 | End: 2025-02-27

## 2025-02-27 RX ORDER — OXYCODONE AND ACETAMINOPHEN 5; 325 MG/1; MG/1
1-2 TABLET ORAL EVERY 6 HOURS PRN
Qty: 12 TABLET | Refills: 0 | Status: SHIPPED | OUTPATIENT
Start: 2025-02-27

## 2025-02-27 RX ORDER — ONDANSETRON 4 MG/1
4 TABLET, ORALLY DISINTEGRATING ORAL EVERY 30 MIN PRN
Status: DISCONTINUED | OUTPATIENT
Start: 2025-02-27 | End: 2025-02-27 | Stop reason: HOSPADM

## 2025-02-27 RX ORDER — OXYMETAZOLINE HYDROCHLORIDE 0.05 G/100ML
SPRAY NASAL PRN
Status: DISCONTINUED | OUTPATIENT
Start: 2025-02-27 | End: 2025-02-27 | Stop reason: HOSPADM

## 2025-02-27 RX ORDER — OXYCODONE HYDROCHLORIDE 5 MG/1
10 TABLET ORAL
Status: DISCONTINUED | OUTPATIENT
Start: 2025-02-27 | End: 2025-02-27 | Stop reason: HOSPADM

## 2025-02-27 RX ORDER — IBUPROFEN 600 MG/1
600 TABLET, FILM COATED ORAL EVERY 6 HOURS PRN
Qty: 30 TABLET | Refills: 0 | Status: SHIPPED | OUTPATIENT
Start: 2025-02-27

## 2025-02-27 RX ORDER — LIDOCAINE HYDROCHLORIDE AND EPINEPHRINE 10; 10 MG/ML; UG/ML
INJECTION, SOLUTION INFILTRATION; PERINEURAL PRN
Status: DISCONTINUED | OUTPATIENT
Start: 2025-02-27 | End: 2025-02-27 | Stop reason: HOSPADM

## 2025-02-27 RX ORDER — ONDANSETRON 2 MG/ML
INJECTION INTRAMUSCULAR; INTRAVENOUS PRN
Status: DISCONTINUED | OUTPATIENT
Start: 2025-02-27 | End: 2025-02-27

## 2025-02-27 RX ADMIN — SODIUM CHLORIDE, POTASSIUM CHLORIDE, SODIUM LACTATE AND CALCIUM CHLORIDE: 600; 310; 30; 20 INJECTION, SOLUTION INTRAVENOUS at 11:11

## 2025-02-27 RX ADMIN — PHENYLEPHRINE HYDROCHLORIDE 50 MCG: 10 INJECTION INTRAVENOUS at 10:15

## 2025-02-27 RX ADMIN — PHENYLEPHRINE HYDROCHLORIDE 50 MCG: 10 INJECTION INTRAVENOUS at 10:18

## 2025-02-27 RX ADMIN — PROPOFOL 125 MCG/KG/MIN: 10 INJECTION, EMULSION INTRAVENOUS at 09:48

## 2025-02-27 RX ADMIN — LIDOCAINE HYDROCHLORIDE 5 ML: 10 INJECTION, SOLUTION INFILTRATION; PERINEURAL at 09:46

## 2025-02-27 RX ADMIN — PHENYLEPHRINE HYDROCHLORIDE 100 MCG: 10 INJECTION INTRAVENOUS at 10:46

## 2025-02-27 RX ADMIN — OXYCODONE HYDROCHLORIDE AND ACETAMINOPHEN 1 TABLET: 5; 325 TABLET ORAL at 12:52

## 2025-02-27 RX ADMIN — DEXAMETHASONE SODIUM PHOSPHATE 10 MG: 10 INJECTION, SOLUTION INTRAMUSCULAR; INTRAVENOUS at 09:47

## 2025-02-27 RX ADMIN — PHENYLEPHRINE HYDROCHLORIDE 100 MCG: 10 INJECTION INTRAVENOUS at 10:09

## 2025-02-27 RX ADMIN — OXYMETAZOLINE HYDROCHLORIDE 2 SPRAY: 0.5 SPRAY NASAL at 09:16

## 2025-02-27 RX ADMIN — FENTANYL CITRATE 25 MCG: 50 INJECTION, SOLUTION INTRAMUSCULAR; INTRAVENOUS at 12:06

## 2025-02-27 RX ADMIN — SUGAMMADEX 200 MG: 100 INJECTION, SOLUTION INTRAVENOUS at 11:05

## 2025-02-27 RX ADMIN — PHENYLEPHRINE HYDROCHLORIDE 100 MCG: 10 INJECTION INTRAVENOUS at 10:03

## 2025-02-27 RX ADMIN — PHENYLEPHRINE HYDROCHLORIDE 100 MCG: 10 INJECTION INTRAVENOUS at 10:06

## 2025-02-27 RX ADMIN — FENTANYL CITRATE 100 MCG: 50 INJECTION, SOLUTION INTRAMUSCULAR; INTRAVENOUS at 09:45

## 2025-02-27 RX ADMIN — SODIUM CHLORIDE, POTASSIUM CHLORIDE, SODIUM LACTATE AND CALCIUM CHLORIDE: 600; 310; 30; 20 INJECTION, SOLUTION INTRAVENOUS at 09:09

## 2025-02-27 RX ADMIN — PHENYLEPHRINE HYDROCHLORIDE 100 MCG: 10 INJECTION INTRAVENOUS at 10:00

## 2025-02-27 RX ADMIN — ONDANSETRON 4 MG: 2 INJECTION INTRAMUSCULAR; INTRAVENOUS at 11:15

## 2025-02-27 RX ADMIN — REMIFENTANIL HYDROCHLORIDE 0.15 MCG/KG/MIN: 1 INJECTION, POWDER, LYOPHILIZED, FOR SOLUTION INTRAVENOUS at 09:50

## 2025-02-27 RX ADMIN — FENTANYL CITRATE 25 MCG: 50 INJECTION, SOLUTION INTRAMUSCULAR; INTRAVENOUS at 12:16

## 2025-02-27 RX ADMIN — PHENYLEPHRINE HYDROCHLORIDE 100 MCG: 10 INJECTION INTRAVENOUS at 10:12

## 2025-02-27 RX ADMIN — ROCURONIUM 50 MG: 50 INJECTION, SOLUTION INTRAVENOUS at 09:46

## 2025-02-27 RX ADMIN — PHENYLEPHRINE HYDROCHLORIDE 100 MCG: 10 INJECTION INTRAVENOUS at 09:57

## 2025-02-27 ASSESSMENT — ACTIVITIES OF DAILY LIVING (ADL)
ADLS_ACUITY_SCORE: 18
ADLS_ACUITY_SCORE: 18
ADLS_ACUITY_SCORE: 19
ADLS_ACUITY_SCORE: 18

## 2025-02-27 NOTE — ANESTHESIA POSTPROCEDURE EVALUATION
Patient: See Blakely    Procedure: Procedure(s):  SEPTOPLASTY WITH CARTILEDGE REMOVAL, NOSE, WITH TURBINOPLASTY and cryoablation of posterior nasal tissues  ENDOSCOPY, NOSE, WITH BALLOON frontal and maxillary SINUPLASTY       Anesthesia Type:  General    Note:  Disposition: Outpatient   Postop Pain Control: Uneventful            Sign Out: Well controlled pain   PONV: No   Neuro/Psych: Uneventful            Sign Out: Acceptable/Baseline neuro status   Airway/Respiratory: Uneventful            Sign Out: Acceptable/Baseline resp. status   CV/Hemodynamics: Uneventful            Sign Out: Acceptable CV status; No obvious hypovolemia; No obvious fluid overload   Other NRE: NONE   DID A NON-ROUTINE EVENT OCCUR? No           Last vitals:  Vitals Value Taken Time   /94 02/27/25 1215   Temp 36.6  C (97.88  F) 02/27/25 1222   Pulse 65 02/27/25 1225   Resp 10 02/27/25 1225   SpO2 96 % 02/27/25 1225   Vitals shown include unfiled device data.    Electronically Signed By: Haley Lewis MD  February 27, 2025  12:27 PM

## 2025-02-27 NOTE — DISCHARGE INSTRUCTIONS
Nasal saline sprays every hour while awake  Start medrol dospack the morning after surgery  Mupirocin ointment as directed (starting the day of surgery)  Avoid nose blowing until your first post operative visit  Sleep with head elevated on several pillows for the first 3 nights to help with swelling  Follow up appointment in 5 days for removal of septal splints  Bring operative photos to your first post operative appointment for review

## 2025-02-27 NOTE — ANESTHESIA PREPROCEDURE EVALUATION
Anesthesia Pre-Procedure Evaluation    Patient: See Blakely   MRN: [id number] : 1985        Procedure : Procedure(s):  SEPTOPLASTY, NOSE, WITH TURBINOPLASTY and cryoablation of posterior nasal tissues  ENDOSCOPY, NOSE, WITH BALLOON frontal and maxillary SINUPLASTY          Past Medical History:   Diagnosis Date    Chronic maxillary sinusitis     Deviated nasal septum     Hypertrophy of both inferior nasal turbinates     Migraine     Pectus excavatum 2021    Sinusitis chronic, frontal     Snoring     Tietze's disease 2021      Past Surgical History:   Procedure Laterality Date    WISDOM TOOTH EXTRACTION        No Known Allergies   Social History     Tobacco Use    Smoking status: Never     Passive exposure: Past    Smokeless tobacco: Never   Substance Use Topics    Alcohol use: Yes      Wt Readings from Last 1 Encounters:   25 71.3 kg (157 lb 3.2 oz)        Anesthesia Evaluation   Pt has had prior anesthetic.     No history of anesthetic complications       ROS/MED HX  ENT/Pulmonary:     (+) sleep apnea, mild, doesn't use CPAP,                                      Neurologic:     (+)      migraines,                          Cardiovascular:       METS/Exercise Tolerance:     Hematologic:       Musculoskeletal:       GI/Hepatic:       Renal/Genitourinary:       Endo:       Psychiatric/Substance Use:       Infectious Disease:       Malignancy:       Other:            Physical Exam    Airway        Mallampati: III       Respiratory Devices and Support         Dental       (+) Completely normal teeth      Cardiovascular   cardiovascular exam normal          Pulmonary   pulmonary exam normal                OUTSIDE LABS:  CBC:   Lab Results   Component Value Date    WBC 4.2 2024    WBC 4.3 2023    HGB 14.8 2024    HGB 14.7 2023    HCT 42.7 2024    HCT 43.0 2023     2024     2023     BMP:   Lab Results   Component Value Date    NA  "137 05/08/2024     06/30/2023    POTASSIUM 4.4 05/08/2024    POTASSIUM 4.2 06/30/2023    CHLORIDE 103 05/08/2024    CHLORIDE 104 06/30/2023    CO2 22 05/08/2024    CO2 26 06/30/2023    BUN 16.1 05/08/2024    BUN 18.9 06/30/2023    CR 1.09 05/08/2024    CR 0.98 06/30/2023     (H) 05/08/2024     (H) 06/30/2023     COAGS: No results found for: \"PTT\", \"INR\", \"FIBR\"  POC: No results found for: \"BGM\", \"HCG\", \"HCGS\"  HEPATIC:   Lab Results   Component Value Date    ALBUMIN 5.0 05/08/2024    PROTTOTAL 7.6 05/08/2024    ALT 19 05/08/2024    AST 20 05/08/2024    ALKPHOS 72 05/08/2024    BILITOTAL 0.3 05/08/2024     OTHER:   Lab Results   Component Value Date    A1C 5.3 05/08/2024    ADRIANNE 9.9 05/08/2024    TSH 1.27 05/08/2024       Anesthesia Plan    ASA Status:  3    NPO Status:  NPO Appropriate    Anesthesia Type: General.     - Airway: ETT   Induction: Intravenous, Propofol.   Maintenance: Balanced.        Consents    Anesthesia Plan(s) and associated risks, benefits, and realistic alternatives discussed. Questions answered and patient/representative(s) expressed understanding.     - Discussed: Risks, Benefits and Alternatives for BOTH SEDATION and the PROCEDURE were discussed     - Discussed with:  Patient            Postoperative Care    Pain management: IV analgesics, Oral pain medications.   PONV prophylaxis: Ondansetron (or other 5HT-3), Dexamethasone or Solumedrol     Comments:               Haley Lewis MD    I have reviewed the pertinent notes and labs in the chart from the past 30 days and (re)examined the patient.  Any updates or changes from those notes are reflected in this note.    Clinically Significant Risk Factors Present on Admission                                          "

## 2025-02-27 NOTE — PROGRESS NOTES
Dr. Chester MD updated on unsigned script. Per- MD he will e-scribe the medication to [facility] pharmacy in [place].

## 2025-02-27 NOTE — ANESTHESIA PROCEDURE NOTES
Airway         Procedure Start/Stop Times: 2/27/2025 9:48 AM  Staff -        CRNA: Arnol Walter APRN CRNA       Performed By: CRNAIndications and Patient Condition       Indications for airway management: mikhail-procedural         Mask difficulty assessment: 1 - vent by mask    Final Airway Details       Final airway type: endotracheal airway       Successful airway: KIESHA  Endotracheal Airway Details        ETT size (mm): 7.5       Cuffed: yes       Successful intubation technique: video laryngoscopy       VL Blade Size: Glidescope 3       Grade View of Cords: 1       Adjucts: stylet       Position: Center       Measured from: gums/teeth       Secured at (cm): 21       Bite block used: None    Post intubation assessment        Placement verified by: capnometry, equal breath sounds and chest rise        Number of attempts at approach: 1       Number of other approaches attempted: 0       Secured with: tape       Ease of procedure: easy       Dentition: Intact       Dental guard used and removed.    Medication(s) Administered   Medication Administration Time: 2/27/2025 9:48 AM

## 2025-02-27 NOTE — ANESTHESIA CARE TRANSFER NOTE
Patient: See Blakely    Procedure: Procedure(s):  SEPTOPLASTY WITH CARTILEDGE REMOVAL, NOSE, WITH TURBINOPLASTY and cryoablation of posterior nasal tissues  ENDOSCOPY, NOSE, WITH BALLOON frontal and maxillary SINUPLASTY       Diagnosis: Deviated nasal septum [J34.2]  Hypertrophy of both inferior nasal turbinates [J34.3]  PND (post-nasal drip) [R09.82]  Chronic maxillary sinusitis [J32.0]  Sinusitis chronic, frontal [J32.1]  Diagnosis Additional Information: No value filed.    Anesthesia Type:   General     Note:    Oropharynx: oropharynx clear of all foreign objects  Level of Consciousness: drowsy  Oxygen Supplementation: face mask  Level of Supplemental Oxygen (L/min / FiO2): 6  Independent Airway: airway patency satisfactory and stable  Dentition: dentition unchanged  Vital Signs Stable: post-procedure vital signs reviewed and stable  Report to RN Given: handoff report given  Patient transferred to: PACU    Handoff Report: Identifed the Patient, Identified the Reponsible Provider, Reviewed the pertinent medical history, Discussed the surgical course, Reviewed Intra-OP anesthesia mangement and issues during anesthesia, Set expectations for post-procedure period and Allowed opportunity for questions and acknowledgement of understanding      Vitals:  Vitals Value Taken Time   /87 02/27/25 1132   Temp 35.9  C (96.62  F) 02/27/25 1134   Pulse 81 02/27/25 1134   Resp 11 02/27/25 1134   SpO2 99 % 02/27/25 1134   Vitals shown include unfiled device data.    Electronically Signed By: RENETTA Benson CRNA  February 27, 2025  11:36 AM

## 2025-02-27 NOTE — OP NOTE
Otolaryngology Operative Note    Date of Operation:  02/27/25     Pre-operative Diagnosis:  1. Recurrent Sinusitis 2. Nasal Septal deviatain   3. Inferior turbinate Hypertrophy  4. Post nasal drip   Post-operative Diagnosis:  same  Procedure(s):  1. Endoscopic sinus surgery (balloon dilation of bilateral frontal and maxillary sinuses)  2.  Nasal Septoplasty with tissue removal 3.  Submucous resection/Outfracture  of inferior turbinates  4. Cryoablation of posterior nasal tissue  6. . Use of Image Guidance.     Surgeon:  Fazal Briggs MD  Assistant(s):  None  Anesthesia:  General     Indications for Procedure:  The risks and the benefits of the procedure were discussed with the patient. A consent form was then signed and placed into the chart.    Procedure in Detail:  The patient was brought into the operating room and placed on the table in a supine position. Anesthesia intubated without any difficulties. A time out was performed with all pertinent personnel in the room. The bed was then rotated 90 degrees.     The nasal septum is injected with 1.5 ml of 1% Lidocaine with Epinephrine at 1:1000,000.   Afrin soaked nasal pledgets were then placed bilaterally   Image Guidance system is registered and checked for accuracy.      After 5 minutes, the pledgets are removed.        Attention was first turned to the left nasal cavity.  Using the cryo Clarifix device cryoablation of posterior nasal tissue was accomplished by 2 lesions each 30 seconds at the base of the middle turbinate.  Once was accomplished the left middle meatus.  The uncinate process was medialized using a right angle probe.  The left frontal and maxillary sinuses were then dilated for 30 seconds using image guided Medtronic balloons.    On the right-hand side the septal balloon was dilated along the floor anteriorly and posteriorly each for 30 seconds up to 8 alfredo.  There was a posterior right deflection of the cartilage.  This was removed by making a  small vertical incision on the left-hand side.  A mucoperichondrial flap was developed on each side and double-action rongeurs were used to remove the thickened deflected cartilage.  Once this was accomplished cryoablation was performed in the standard fashion using the Roovyn design device by creating 230-second lesions at the base of the middle turbinate on the right-hand side.  Attention then turned to the uncinate process.  It was medialized using a right angle probe.  The right frontal and right maxillary sinus was then dilated using the [employer] balloon for total of 30 seconds each.    Each inferior turbinate was injected with 1/2 cc of saline.  Using the 8 DR MARILU machuca submucous resection of the turbinates was then accomplished.  Note that both turbinates were also outfractured using a large nasal speculum.  Finally a small wedge of cartilage redundant cartilage was removed at the base of the septum on the left-hand side by creating a small mucoperichondrial flap using a 15 blade.  The flap was then repositioned and anatomic position.    Finally Ramirez splints were secured in position using a 2-0 silk suture.    An OG tube was then passed to empty the proximal esophagus of secretions.    The patient tolerated the procedure well without incident.     Findings: 1. Right mid and posterior septal cartilage deflection with left anterior cartilaginous spur.  2. Inferior turbinate hypertrophy laterally.      EBL:  Less than 20 cc    Complications:  none    Disposition: The patient was extubated in the operating room and was transferred to the PACU in stable condition.       Fazal Briggs MD

## 2025-05-08 SDOH — HEALTH STABILITY: PHYSICAL HEALTH: ON AVERAGE, HOW MANY DAYS PER WEEK DO YOU ENGAGE IN MODERATE TO STRENUOUS EXERCISE (LIKE A BRISK WALK)?: 5 DAYS

## 2025-05-08 SDOH — HEALTH STABILITY: PHYSICAL HEALTH: ON AVERAGE, HOW MANY MINUTES DO YOU ENGAGE IN EXERCISE AT THIS LEVEL?: 10 MIN

## 2025-05-08 ASSESSMENT — SOCIAL DETERMINANTS OF HEALTH (SDOH): HOW OFTEN DO YOU GET TOGETHER WITH FRIENDS OR RELATIVES?: ONCE A WEEK

## 2025-05-13 ENCOUNTER — TELEPHONE (OUTPATIENT)
Dept: FAMILY MEDICINE | Facility: CLINIC | Age: 40
End: 2025-05-13

## 2025-05-13 ENCOUNTER — OFFICE VISIT (OUTPATIENT)
Dept: FAMILY MEDICINE | Facility: CLINIC | Age: 40
End: 2025-05-13
Attending: PHYSICIAN ASSISTANT
Payer: COMMERCIAL

## 2025-05-13 VITALS
HEART RATE: 84 BPM | BODY MASS INDEX: 22.43 KG/M2 | OXYGEN SATURATION: 95 % | RESPIRATION RATE: 12 BRPM | DIASTOLIC BLOOD PRESSURE: 75 MMHG | HEIGHT: 71 IN | TEMPERATURE: 98.2 F | WEIGHT: 160.2 LBS | SYSTOLIC BLOOD PRESSURE: 107 MMHG

## 2025-05-13 DIAGNOSIS — B35.6 JOCK ITCH: Primary | ICD-10-CM

## 2025-05-13 DIAGNOSIS — Z00.00 ROUTINE GENERAL MEDICAL EXAMINATION AT A HEALTH CARE FACILITY: ICD-10-CM

## 2025-05-13 DIAGNOSIS — B35.6 JOCK ITCH: ICD-10-CM

## 2025-05-13 LAB
ERYTHROCYTE [DISTWIDTH] IN BLOOD BY AUTOMATED COUNT: 12.5 % (ref 10–15)
EST. AVERAGE GLUCOSE BLD GHB EST-MCNC: 105 MG/DL
HBA1C MFR BLD: 5.3 % (ref 0–5.6)
HCT VFR BLD AUTO: 42.6 % (ref 40–53)
HGB BLD-MCNC: 14.8 G/DL (ref 13.3–17.7)
MCH RBC QN AUTO: 29.9 PG (ref 26.5–33)
MCHC RBC AUTO-ENTMCNC: 34.7 G/DL (ref 31.5–36.5)
MCV RBC AUTO: 86 FL (ref 78–100)
PLATELET # BLD AUTO: 255 10E3/UL (ref 150–450)
RBC # BLD AUTO: 4.95 10E6/UL (ref 4.4–5.9)
WBC # BLD AUTO: 4.3 10E3/UL (ref 4–11)

## 2025-05-13 PROCEDURE — 36415 COLL VENOUS BLD VENIPUNCTURE: CPT | Performed by: PHYSICIAN ASSISTANT

## 2025-05-13 PROCEDURE — 99396 PREV VISIT EST AGE 40-64: CPT | Performed by: PHYSICIAN ASSISTANT

## 2025-05-13 PROCEDURE — 80061 LIPID PANEL: CPT | Performed by: PHYSICIAN ASSISTANT

## 2025-05-13 PROCEDURE — G2211 COMPLEX E/M VISIT ADD ON: HCPCS | Performed by: PHYSICIAN ASSISTANT

## 2025-05-13 PROCEDURE — 83036 HEMOGLOBIN GLYCOSYLATED A1C: CPT | Performed by: PHYSICIAN ASSISTANT

## 2025-05-13 PROCEDURE — 85027 COMPLETE CBC AUTOMATED: CPT | Performed by: PHYSICIAN ASSISTANT

## 2025-05-13 PROCEDURE — 3074F SYST BP LT 130 MM HG: CPT | Performed by: PHYSICIAN ASSISTANT

## 2025-05-13 PROCEDURE — 3078F DIAST BP <80 MM HG: CPT | Performed by: PHYSICIAN ASSISTANT

## 2025-05-13 PROCEDURE — 99213 OFFICE O/P EST LOW 20 MIN: CPT | Mod: 25 | Performed by: PHYSICIAN ASSISTANT

## 2025-05-13 PROCEDURE — 80053 COMPREHEN METABOLIC PANEL: CPT | Performed by: PHYSICIAN ASSISTANT

## 2025-05-13 PROCEDURE — 84443 ASSAY THYROID STIM HORMONE: CPT | Performed by: PHYSICIAN ASSISTANT

## 2025-05-13 NOTE — TELEPHONE ENCOUNTER
miconazole (MICATIN) 2 % external powder 71 g 0 5/13/2025 -- No   Sig - Route: Apply topically 2 times daily as needed for itching. - Topical   Sent to pharmacy as: Miconazole Nitrate 2 % External Powder (MICATIN)     Pharmacy wants to know how long this is supposed to last ? Please advise and send new script

## 2025-05-13 NOTE — PROGRESS NOTES
Preventive Care Visit  [facility]  Caitlin Ruff PA-C, Physician Assistant - Medical  May 13, 2025      Assessment & Plan     Routine general medical examination at a health care facility  Labs updated today.  Vaccines- UTD  - PRIMARY CARE FOLLOW-UP SCHEDULING  - CBC with platelets  - Comprehensive metabolic panel  - Lipid panel reflex to direct LDL Fasting  - Hemoglobin A1c  - TSH with free T4 reflex  - CBC with platelets  - Comprehensive metabolic panel  - Lipid panel reflex to direct LDL Fasting  - Hemoglobin A1c  - TSH with free T4 reflex    Jock itch  Chronic issue, recurrent issue once he stops the medication. Refill given. Recommend he return back to Dermatology to discuss treatment options for chronic, recurrent jock itch.  - miconazole (MICATIN) 2 % external powder  Dispense: 71 g; Refill: 0      Patient has been advised of split billing requirements and indicates understanding: Yes        Counseling  Appropriate preventive services were addressed with this patient via screening, questionnaire, or discussion as appropriate for fall prevention, nutrition, physical activity, Tobacco-use cessation, social engagement, weight loss and cognition.  Checklist reviewing preventive services available has been given to the patient.  Reviewed patient's diet, addressing concerns and/or questions.   The patient was instructed to see the dentist every 6 months.       Risks, benefits and alternatives were discussed with patient. Agreeable to the plan of care.    The longitudinal plan of care for the diagnosis(es)/condition(s) as documented were addressed during this visit. Due to the added complexity in care, I will continue to support the patient in the subsequent management and ongoing continuity of care.      Dav Ott is a 39 year old, presenting for the following:  Physical        5/13/2025     7:14 AM   Additional Questions   Roomed by SE Metz CMA([facility])           HPI       Advance Care Planning    Discussed advance care planning with patient; informed AVS has link to [facility] [employer].        5/8/2025   General Health   How would you rate your overall physical health? Good   Feel stress (tense, anxious, or unable to sleep) Only a little   (!) STRESS CONCERN      5/8/2025   Nutrition   Three or more servings of calcium each day? (!) NO   Diet: Regular (no restrictions)   How many servings of fruit and vegetables per day? (!) 0-1   How many sweetened beverages each day? 0-1         5/8/2025   Exercise   Days per week of moderate/strenous exercise 5 days   Average minutes spent exercising at this level 10 min         5/8/2025   Social Factors   Frequency of gathering with friends or relatives Once a week   Worry food won't last until get money to buy more No   Food not last or not have enough money for food? No   Do you have housing? (Housing is defined as stable permanent housing and does not include staying outside in a car, in a tent, in an abandoned building, in an overnight shelter, or couch-surfing.) Yes   Are you worried about losing your housing? No   Lack of transportation? No   Unable to get utilities (heat,electricity)? No         5/8/2025   Dental   Dentist two times every year? (!) NO         Today's PHQ-2 Score:       5/12/2025    10:06 AM   PHQ-2 ( 1999 [employer])   Q1: Little interest or pleasure in doing things 0   Q2: Feeling down, depressed or hopeless 0   PHQ-2 Score 0    Q1: Little interest or pleasure in doing things Not at all   Q2: Feeling down, depressed or hopeless Not at all   PHQ-2 Score 0       Patient-reported           5/8/2025   Substance Use   Alcohol more than 3/day or more than 7/wk No   Do you use any other substances recreationally? No     Social History     Tobacco Use    Smoking status: Never     Passive exposure: Past    Smokeless tobacco: Never   Vaping Use    Vaping status: Never Used   Substance Use Topics    Alcohol use: Yes    Drug  use: No           5/8/2025   STI Screening   New sexual partner(s) since last STI/HIV test? No   ASCVD Risk   The 10-year ASCVD risk score (Delmis DK, et al., 2019) is: 0.7%    Values used to calculate the score:      Age: 39 years      Sex: Male      Is Non- : No      Diabetic: No      Tobacco smoker: No      Systolic Blood Pressure: 107 mmHg      Is BP treated: No      HDL Cholesterol: 67 mg/dL      Total Cholesterol: 223 mg/dL        5/8/2025   Contraception/Family Planning   Questions about contraception or family planning No        Reviewed and updated as needed this visit by Provider                    Patient Active Problem List   Diagnosis    Migraine headache    Other malaise and fatigue    Pectus excavatum    Tietze's disease     Past Surgical History:   Procedure Laterality Date    ENDOSCOPIC BALLOON SINUPLASTY, OPTICAL TRACKING SYSTEM ENDOSCOPIC SINUS SURGERY, COMBINED Bilateral 2/27/2025    Procedure: ENDOSCOPY, NOSE, WITH BALLOON frontal and maxillary SINUPLASTY BILATERAL;  Surgeon: Fazal Briggs MD;  Location: [facility] OR    RECONSTRUCT NOSE AND SEPTUM (FUNCTIONAL) N/A 2/27/2025    Procedure: SEPTOPLASTY WITH CARTILEDGE REMOVAL, NOSE, WITH TURBINOPLASTY and cryoablation of posterior nasal tissues;  Surgeon: Fazal Briggs MD;  Location: [facility] OR    WISDOM TOOTH EXTRACTION         Social History     Tobacco Use    Smoking status: Never     Passive exposure: Past    Smokeless tobacco: Never   Substance Use Topics    Alcohol use: Yes     Family History   Problem Relation Age of Onset    Sleep Apnea Father     Colon Cancer Father 62    Throat cancer Father          Current Outpatient Medications   Medication Sig Dispense Refill    ketoconazole (NIZORAL) 2 % external cream APPLY THIN LAYER TO AFFECTED AREAS ON THE FACE 2X DAILY UNTIL RESOLVED THEN SLOWLY TAPER OFF.      ketoconazole (NIZORAL) 2 % external shampoo WASH AFFECTED AREAS IN GROIN 2-3 WEEKLY. LATHER  "AND LET SIT 2-3 MINUTES BEFORE RINSING      MAGNESIUM OXIDE PO Take by mouth.      metroNIDAZOLE (METROCREAM) 0.75 % external cream APPLY A THIN LAYER TO THE AFFECTED AREAS ON FACE 1-2X DAILY, ONGOING.      miconazole (MICATIN) 2 % external powder Apply topically 2 times daily as needed for itching. 71 g 0    mupirocin (BACTROBAN) 2 % external ointment Apply a pea sized amount to inside of each nostril 3x daily with Qtip for 10 days 22 g 0    sodium chloride (OCEAN) 0.65 % nasal spray Spray 1-2 sprays in nostril every hour as needed for congestion (nasal dryness). Use in EACH nostril. 44 mL 1    Sulfacetamide Sodium-Sulfur 8-4 % SUSP WASH FACE 1-2X DAILY. LATHER AND LET SIT FOR SEVERAL MINUTES PRIOR RINSING.       No Known Allergies      Review of Systems  Constitutional, HEENT, cardiovascular, pulmonary, gi and gu systems are negative, except as otherwise noted.     Objective    Exam  /75   Pulse 84   Temp 98.2  F (36.8  C) (Oral)   Resp 12   Ht 1.81 m (5' 11.25\")   Wt 72.7 kg (160 lb 3.2 oz)   SpO2 95%   BMI 22.19 kg/m     Estimated body mass index is 22.19 kg/m  as calculated from the following:    Height as of this encounter: 1.81 m (5' 11.25\").    Weight as of this encounter: 72.7 kg (160 lb 3.2 oz).    Physical Exam  GENERAL: alert and no distress  EYES: Eyes grossly normal to inspection, PERRL and conjunctivae and sclerae normal  HENT: ear canals and TM's normal, nose and mouth without ulcers or lesions  NECK: no adenopathy, no asymmetry, masses, or scars  RESP: lungs clear to auscultation - no rales, rhonchi or wheezes  CV: regular rate and rhythm, normal S1 S2, no S3 or S4, no murmur, click or rub, no peripheral edema  ABDOMEN: soft, nontender, no hepatosplenomegaly, no masses and bowel sounds normal  MS: no gross musculoskeletal defects noted, no edema  SKIN: no suspicious lesions or rashes  NEURO: Normal strength and tone, mentation intact and speech normal  PSYCH: mentation appears normal, " affect normal/bright        Signed Electronically by: Caitlin Ruff PA-C

## 2025-05-13 NOTE — PATIENT INSTRUCTIONS
Patient Education   Preventive Care Advice   This is general advice given by our system to help you stay healthy. However, your care team may have specific advice just for you. Please talk to your care team about your preventive care needs.  Nutrition  Eat 5 or more servings of fruits and vegetables each day.  Try wheat bread, brown rice and whole grain pasta (instead of white bread, rice, and pasta).  Get enough calcium and vitamin D. Check the label on foods and aim for 100% of the RDA (recommended daily allowance).  Lifestyle  Exercise at least 150 minutes each week  (30 minutes a day, 5 days a week).  Do muscle strengthening activities 2 days a week. These help control your weight and prevent disease.  No smoking.  Wear sunscreen to prevent skin cancer.  Have a dental exam and cleaning every 6 months.  Yearly exams  See your health care team every year to talk about:  Any changes in your health.  Any medicines your care team has prescribed.  Preventive care, family planning, and ways to prevent chronic diseases.  Shots (vaccines)   HPV shots (up to age 26), if you've never had them before.  Hepatitis B shots (up to age 59), if you've never had them before.  COVID-19 shot: Get this shot when it's due.  Flu shot: Get a flu shot every year.  Tetanus shot: Get a tetanus shot every 10 years.  Pneumococcal, hepatitis A, and RSV shots: Ask your care team if you need these based on your risk.  Shingles shot (for age 50 and up)  General health tests  Diabetes screening:  Starting at age 35, Get screened for diabetes at least every 3 years.  If you are younger than age 35, ask your care team if you should be screened for diabetes.  Cholesterol test: At age 39, start having a cholesterol test every 5 years, or more often if advised.  Bone density scan (DEXA): At age 50, ask your care team if you should have this scan for osteoporosis (brittle bones).  Hepatitis C: Get tested at least once in your life.  STIs (sexually  transmitted infections)  Before age 24: Ask your care team if you should be screened for STIs.  After age 24: Get screened for STIs if you're at risk. You are at risk for STIs (including HIV) if:  You are sexually active with more than one person.  You don't use condoms every time.  You or a partner was diagnosed with a sexually transmitted infection.  If you are at risk for HIV, ask about PrEP medicine to prevent HIV.  Get tested for HIV at least once in your life, whether you are at risk for HIV or not.  Cancer screening tests  Cervical cancer screening: If you have a cervix, begin getting regular cervical cancer screening tests starting at age 21.  Breast cancer scan (mammogram): If you've ever had breasts, begin having regular mammograms starting at age 40. This is a scan to check for breast cancer.  Colon cancer screening: It is important to start screening for colon cancer at age 45.  Have a colonoscopy test every 10 years (or more often if you're at risk) Or, ask your provider about stool tests like a FIT test every year or Cologuard test every 3 years.  To learn more about your testing options, visit:   .  For help making a decision, visit:   https://bit.ly/tj19198.  Prostate cancer screening test: If you have a prostate, ask your care team if a prostate cancer screening test (PSA) at age 55 is right for you.  Lung cancer screening: If you are a current or former smoker ages 50 to 80, ask your care team if ongoing lung cancer screenings are right for you.  For informational purposes only. Not to replace the advice of your health care provider. Copyright   2023 [employer]. All rights reserved. Clinically reviewed by the [facility] Transitions Program. [employer] [id number] - REV 01/24.

## 2025-05-14 LAB
ALBUMIN SERPL BCG-MCNC: 4.8 G/DL (ref 3.5–5.2)
ALP SERPL-CCNC: 73 U/L (ref 40–150)
ALT SERPL W P-5'-P-CCNC: 37 U/L (ref 0–70)
ANION GAP SERPL CALCULATED.3IONS-SCNC: 10 MMOL/L (ref 7–15)
AST SERPL W P-5'-P-CCNC: 35 U/L (ref 0–45)
BILIRUB SERPL-MCNC: 0.5 MG/DL
BUN SERPL-MCNC: 16.6 MG/DL (ref 6–20)
CALCIUM SERPL-MCNC: 9.5 MG/DL (ref 8.8–10.4)
CHLORIDE SERPL-SCNC: 102 MMOL/L (ref 98–107)
CHOLEST SERPL-MCNC: 249 MG/DL
CREAT SERPL-MCNC: 1.03 MG/DL (ref 0.67–1.17)
EGFRCR SERPLBLD CKD-EPI 2021: >90 ML/MIN/1.73M2
FASTING STATUS PATIENT QL REPORTED: YES
FASTING STATUS PATIENT QL REPORTED: YES
GLUCOSE SERPL-MCNC: 109 MG/DL (ref 70–99)
HCO3 SERPL-SCNC: 28 MMOL/L (ref 22–29)
HDLC SERPL-MCNC: 57 MG/DL
LDLC SERPL CALC-MCNC: 170 MG/DL
NONHDLC SERPL-MCNC: 192 MG/DL
POTASSIUM SERPL-SCNC: 4.1 MMOL/L (ref 3.4–5.3)
PROT SERPL-MCNC: 7.2 G/DL (ref 6.4–8.3)
SODIUM SERPL-SCNC: 140 MMOL/L (ref 135–145)
TRIGL SERPL-MCNC: 109 MG/DL
TSH SERPL DL<=0.005 MIU/L-ACNC: 1.76 UIU/ML (ref 0.3–4.2)

## 2025-05-16 ENCOUNTER — RESULTS FOLLOW-UP (OUTPATIENT)
Dept: FAMILY MEDICINE | Facility: CLINIC | Age: 40
End: 2025-05-16

## 2025-05-19 ENCOUNTER — TELEPHONE (OUTPATIENT)
Dept: FAMILY MEDICINE | Facility: CLINIC | Age: 40
End: 2025-05-19
Payer: COMMERCIAL

## 2025-05-19 NOTE — TELEPHONE ENCOUNTER
Called pharmacy and gave verbal order to change the miconazole external powder prescription to 85 g dispensing and also that it should be a 14-day supply.    Antonio Alvarez RN     [facility]

## 2025-05-19 NOTE — TELEPHONE ENCOUNTER
Dee with [facility] calls regarding miconazole external powder prescription:       Dee states the miconazole comes in a 85 g package, needing verbal ok to dispense the 85 g. Additionally, Dee asks how many days this supply should last patient. Informed Dee we would check with prescribing provider, Caitlin Ruff PA-C, and call back.     EVANGELISTA Sears   [facility]

## 2025-06-03 ENCOUNTER — OFFICE VISIT (OUTPATIENT)
Dept: FAMILY MEDICINE | Facility: CLINIC | Age: 40
End: 2025-06-03
Payer: COMMERCIAL

## 2025-06-03 VITALS
BODY MASS INDEX: 22.46 KG/M2 | WEIGHT: 160.4 LBS | SYSTOLIC BLOOD PRESSURE: 109 MMHG | TEMPERATURE: 98.9 F | DIASTOLIC BLOOD PRESSURE: 64 MMHG | HEIGHT: 71 IN | OXYGEN SATURATION: 96 % | RESPIRATION RATE: 16 BRPM | HEART RATE: 96 BPM

## 2025-06-03 DIAGNOSIS — N50.89 SCROTAL MASS: Primary | ICD-10-CM

## 2025-06-03 PROCEDURE — 3074F SYST BP LT 130 MM HG: CPT | Performed by: PHYSICIAN ASSISTANT

## 2025-06-03 PROCEDURE — 99213 OFFICE O/P EST LOW 20 MIN: CPT | Performed by: PHYSICIAN ASSISTANT

## 2025-06-03 PROCEDURE — 3078F DIAST BP <80 MM HG: CPT | Performed by: PHYSICIAN ASSISTANT

## 2025-06-03 NOTE — PROGRESS NOTES
"  Assessment & Plan     Scrotal mass  Patient is here today for scrotal mass he found on self exam. On physical exam there is very small almost calcium deposit like mass on upper left scrotum; unclear etiology. Recommend U/S to further assess, will refer onto Urology for further evaluation.  - US Testicular & Scrotum w Doppler Ltd  - Adult Urology  Referral      Risks, benefits and alternatives were discussed with patient. Agreeable to the plan of care.      Dav Ott is a 39 year old, presenting for the following health issues:  Issue in groin        6/3/2025     7:06 AM   Additional Questions   Roomed by      History of Present Illness       Reason for visit:  Hard spot in groin  Symptom onset:  1-3 days ago   He is taking medications regularly.      Found hard spot near scrotum  Now a little irritated  No growth since noticing about 2 weeks ago  Feels maybe more in the muscle  No sores or discharge          Review of Systems  Constitutional, HEENT, cardiovascular, pulmonary, gi and gu systems are negative, except as otherwise noted.      Objective    /64   Pulse 96   Temp 98.9  F (37.2  C)   Resp 16   Ht 1.81 m (5' 11.25\")   Wt 72.8 kg (160 lb 6.4 oz)   SpO2 96%   BMI 22.21 kg/m    Body mass index is 22.21 kg/m .  Physical Exam   GENERAL: alert and no distress  NECK: no adenopathy, no asymmetry, masses, or scars  RESP: lungs clear to auscultation - no rales, rhonchi or wheezes  CV: regular rate and rhythm, normal S1 S2, no S3 or S4, no murmur, click or rub, no peripheral edema   (male): testicles normal without atrophy or masses, no hernias, penis normal without urethral discharge, and very small scrotal mass that is palpable, firm slightly mobile, feels like a calcium deposit  MS: no gross musculoskeletal defects noted, no edema          Signed Electronically by: Caitlin Ruff PA-C    "

## 2025-06-04 ENCOUNTER — HOSPITAL ENCOUNTER (OUTPATIENT)
Dept: ULTRASOUND IMAGING | Facility: HOSPITAL | Age: 40
Discharge: HOME | End: 2025-06-04
Attending: PHYSICIAN ASSISTANT
Payer: COMMERCIAL

## 2025-06-04 ENCOUNTER — PATIENT OUTREACH (OUTPATIENT)
Dept: CARE COORDINATION | Facility: CLINIC | Age: 40
End: 2025-06-04
Payer: COMMERCIAL

## 2025-06-04 DIAGNOSIS — N50.89 SCROTAL MASS: ICD-10-CM

## 2025-06-04 PROCEDURE — 76870 US EXAM SCROTUM: CPT

## 2025-06-06 ENCOUNTER — RESULTS FOLLOW-UP (OUTPATIENT)
Dept: FAMILY MEDICINE | Facility: CLINIC | Age: 40
End: 2025-06-06

## 2025-07-30 ENCOUNTER — OFFICE VISIT (OUTPATIENT)
Dept: FAMILY MEDICINE | Facility: CLINIC | Age: 40
End: 2025-07-30
Payer: COMMERCIAL

## 2025-07-30 VITALS
SYSTOLIC BLOOD PRESSURE: 118 MMHG | DIASTOLIC BLOOD PRESSURE: 70 MMHG | TEMPERATURE: 98.1 F | OXYGEN SATURATION: 98 % | RESPIRATION RATE: 20 BRPM | HEART RATE: 87 BPM | HEIGHT: 71 IN | WEIGHT: 158.2 LBS | BODY MASS INDEX: 22.15 KG/M2

## 2025-07-30 DIAGNOSIS — L50.9 HIVES: Primary | ICD-10-CM

## 2025-07-30 PROCEDURE — 99213 OFFICE O/P EST LOW 20 MIN: CPT | Performed by: NURSE PRACTITIONER

## 2025-07-30 PROCEDURE — 3074F SYST BP LT 130 MM HG: CPT | Performed by: NURSE PRACTITIONER

## 2025-07-30 PROCEDURE — 3078F DIAST BP <80 MM HG: CPT | Performed by: NURSE PRACTITIONER

## 2025-07-30 NOTE — PROGRESS NOTES
"  Assessment & Plan     Hives  Appearance of hives and recommend treating with over the counter cetirizine and famotidine twice daily for up to 2 weeks.  Also recommend reviewing environment and topical products to ensure minimal chemicals.        Dav Ott is a 39 year old, presenting for the following health issues:  Rash (5 days)        7/30/2025     6:52 AM   Additional Questions   Roomed by Gemini PINEDA.     Rash  Associated symptoms include a rash.   History of Present Illness       Reason for visit:  Rash  Symptom onset:  3-7 days ago  Symptom intensity:  Moderate  Symptom progression:  Staying the same  Had these symptoms before:  No   He is taking medications regularly.        Rash  Onset/Duration: 5 days  Description  Location: chest, knees, and legs   Character: round, red  Itching: moderate  Intensity:  moderate  Progression of Symptoms:  same and constant  Accompanying signs and symptoms:   Fever: No  Body aches or joint pain: YES  Sore throat symptoms: YES - more of feeling of mucous in throat  Recent cold symptoms: No  History:           Previous episodes of similar rash: yes on legs   New exposures:  None  Recent travel: No  Exposure to similar rash: No  Precipitating or alleviating factors: no  Therapies tried and outcome: niystatin cream      States the round red lesion on chest resolved.  But there is a round red lesion on abdomen now.            Objective    /70 (BP Location: Right arm, Patient Position: Sitting, Cuff Size: Adult Regular)   Pulse 87   Temp 98.1  F (36.7  C) (Oral)   Resp 20   Ht 1.81 m (5' 11.25\")   Wt 71.8 kg (158 lb 3.2 oz)   SpO2 98%   BMI 21.91 kg/m    Body mass index is 21.91 kg/m .  Physical Exam   GENERAL: alert and no distress  EYES: Eyes grossly normal to inspection, PERRL and conjunctivae and sclerae normal  HENT: ear canals and TM's normal, nose and mouth without ulcers or lesions  NECK: no adenopathy, no asymmetry, masses, or scars  CV: regular rates and " rhythm, normal S1 S2, no S3 or S4, and no murmur, click or rub  SKIN: round erythema on abdomen that is blanchable  PSYCH: mentation appears normal, affect normal/bright            Signed Electronically by: RENETTA Briones CNP

## 2025-07-30 NOTE — PATIENT INSTRUCTIONS
Cetirizine (Zyrtec) 10 mg twice daily for up to 2 weeks  Famotidine (Pepcid) 20 mg twice daily daily for up to 2 weeks  Use both together to treat hives

## (undated) DEVICE — DEVICE CRYOSURGICAL CLARIFIX 1XPROBE 2 CANISTER CFX-2000

## (undated) DEVICE — SU SILK 2-0 SH 30" K833H

## (undated) DEVICE — ESU GROUND PAD ADULT REM W/15' CORD E7507DB

## (undated) DEVICE — DRAPE U SPLIT 74X120" 29440

## (undated) DEVICE — SOL NACL 0.9% INJ 500ML BAG 2B1323Q

## (undated) DEVICE — WAND ELECTROSURGICAL REFLEX ULTRA SML DIA EICA4835-01

## (undated) DEVICE — NDS RELIEVA TRACT 16MM X 40MM RT1640A

## (undated) DEVICE — SOL NACL 0.9% IRRIG 1000ML BOTTLE 2F7124

## (undated) DEVICE — SOL WATER IRRIG 1000ML BOTTLE 2F7114

## (undated) DEVICE — TRACKER PATIENT NON-INVASIVE AXIEM 9734887XOM

## (undated) DEVICE — NDL 27GA 1 1/2" 1188827112

## (undated) DEVICE — DRSG TELFA 3X4" 1050

## (undated) DEVICE — Device

## (undated) DEVICE — TRACKER PATIENT NON-INVASIVE AXIEM 9734887

## (undated) DEVICE — PREP DYNA-HEX 4% CHG SCRUB 4OZ BOTTLE MDS098710

## (undated) DEVICE — SINUS BALLOON EM SEEKER 6.0X17.0MM 70DEG 1830617FRT70

## (undated) DEVICE — PREP POVIDONE-IODINE 10% SOLUTION 4OZ BOTTLE MDS093944

## (undated) DEVICE — SPONGE NEURO 1/2X3 WECK 200104

## (undated) DEVICE — SUCTION MANIFOLD NEPTUNE 2 SYS 1 PORT 702-025-000

## (undated) DEVICE — TRACKER ENT OTS INSTRUMENT FUSION 9733533

## (undated) DEVICE — GOWN LG DISP 9515

## (undated) DEVICE — TUBING SUCTION MEDI-VAC 1/4"X20' N620A

## (undated) DEVICE — DRSG TEGADERM 2 3/8X2 3/4" 1624W

## (undated) DEVICE — ANTIFOG SOLUTION W/FOAM PAD 31142527

## (undated) DEVICE — SINUS BALL INFLATION DEVICE BID30

## (undated) DEVICE — SYRINGE EAR/ULCER STERILE 2 OZ BULB 4172

## (undated) DEVICE — TRAY PREP DRY SKIN SCRUB 067

## (undated) DEVICE — CUSTOM PACK MINOR SBA5BMNHEA

## (undated) DEVICE — KIT INFLATOR BALLOON 18INFKIT

## (undated) DEVICE — DRAPE SLEEVE 599

## (undated) DEVICE — SYR 03ML LL W/O NDL 309657

## (undated) DEVICE — GLOVE SURG PI ULTRA TOUCH M SZ 7-1/2 LF

## (undated) DEVICE — GLOVE SURG PI ULTRA TOUCH M SZ 7 LF 42670

## (undated) DEVICE — WAND COBLATION TURBINATOR RDC ARIS 72290113

## (undated) RX ORDER — FENTANYL CITRATE 50 UG/ML
INJECTION, SOLUTION INTRAMUSCULAR; INTRAVENOUS
Status: DISPENSED
Start: 2025-02-27

## (undated) RX ORDER — LIDOCAINE HYDROCHLORIDE 10 MG/ML
INJECTION, SOLUTION EPIDURAL; INFILTRATION; INTRACAUDAL; PERINEURAL
Status: DISPENSED
Start: 2025-02-27

## (undated) RX ORDER — DEXAMETHASONE SODIUM PHOSPHATE 10 MG/ML
INJECTION, SOLUTION INTRAMUSCULAR; INTRAVENOUS
Status: DISPENSED
Start: 2025-02-27

## (undated) RX ORDER — PROPOFOL 10 MG/ML
INJECTION, EMULSION INTRAVENOUS
Status: DISPENSED
Start: 2025-02-27

## (undated) RX ORDER — ONDANSETRON 2 MG/ML
INJECTION INTRAMUSCULAR; INTRAVENOUS
Status: DISPENSED
Start: 2025-02-27

## (undated) RX ORDER — LIDOCAINE HYDROCHLORIDE AND EPINEPHRINE 10; 10 MG/ML; UG/ML
INJECTION, SOLUTION INFILTRATION; PERINEURAL
Status: DISPENSED
Start: 2025-02-27